# Patient Record
Sex: FEMALE | Race: WHITE | NOT HISPANIC OR LATINO | Employment: PART TIME | URBAN - METROPOLITAN AREA
[De-identification: names, ages, dates, MRNs, and addresses within clinical notes are randomized per-mention and may not be internally consistent; named-entity substitution may affect disease eponyms.]

---

## 2017-01-23 ENCOUNTER — ALLSCRIPTS OFFICE VISIT (OUTPATIENT)
Dept: OTHER | Facility: OTHER | Age: 42
End: 2017-01-23

## 2017-01-23 LAB
FLUAV AG SPEC QL IA: POSITIVE
INFLUENZA B AG (HISTORICAL): NEGATIVE

## 2017-06-13 ENCOUNTER — ALLSCRIPTS OFFICE VISIT (OUTPATIENT)
Dept: OTHER | Facility: OTHER | Age: 42
End: 2017-06-13

## 2017-06-13 DIAGNOSIS — N93.8 OTHER SPECIFIED ABNORMAL UTERINE AND VAGINAL BLEEDING: ICD-10-CM

## 2017-06-13 LAB
BILIRUB UR QL STRIP: NEGATIVE
CLARITY UR: NORMAL
COLOR UR: YELLOW
GLUCOSE (HISTORICAL): NORMAL
HGB UR QL STRIP.AUTO: NEGATIVE
KETONES UR STRIP-MCNC: NORMAL MG/DL
LEUKOCYTE ESTERASE UR QL STRIP: NEGATIVE
NITRITE UR QL STRIP: NEGATIVE
PH UR STRIP.AUTO: 6 [PH]
PROT UR STRIP-MCNC: NEGATIVE MG/DL
SP GR UR STRIP.AUTO: 1.02
UROBILINOGEN UR QL STRIP.AUTO: NORMAL

## 2018-01-13 VITALS
TEMPERATURE: 99.6 F | HEART RATE: 60 BPM | SYSTOLIC BLOOD PRESSURE: 120 MMHG | WEIGHT: 145 LBS | BODY MASS INDEX: 22.76 KG/M2 | RESPIRATION RATE: 16 BRPM | OXYGEN SATURATION: 99 % | HEIGHT: 67 IN | DIASTOLIC BLOOD PRESSURE: 68 MMHG

## 2018-01-13 VITALS
BODY MASS INDEX: 22.13 KG/M2 | TEMPERATURE: 100.8 F | OXYGEN SATURATION: 99 % | DIASTOLIC BLOOD PRESSURE: 80 MMHG | RESPIRATION RATE: 16 BRPM | SYSTOLIC BLOOD PRESSURE: 138 MMHG | HEART RATE: 86 BPM | WEIGHT: 141 LBS | HEIGHT: 67 IN

## 2018-01-15 NOTE — PROGRESS NOTES
Assessment    1  Encounter for preventive health examination (V70 0) (Z00 00)   2  Pharyngoesophageal dysphagia (787 24) (R13 14)   3  Pain of left eye (379 91) (H57 12)    Plan   Health Maintenance    · Always use a seat belt and shoulder strap when riding or driving a motor vehicle ;  Status:Complete;   Done: 89VIA7043   · Begin a limited exercise program ; Status:Complete;   Done: 78CIN4114   · Drink plenty of fluids ; Status:Complete;   Done: 94DNX9304   · Eat a low fat and low cholesterol diet ; Status:Complete;   Done: 56QFY8900   · Use a sun block product with an SPF of 15 or more ; Status:Complete;   Done:  80WYX9370   · We encourage all of our patients to exercise regularly  30 minutes of exercise or physical  activity five or more days a week is recommended for children and adults ;  Status:Complete;   Done: 60AIN6123   · We recommend routine visits to a dentist ; Status:Complete;   Done: 61RJP0998   · (1) CBC/PLT/DIFF; Status:Resulted - Requires Verification;   Done: 91KPW2613 12:00AM   · (1) COMPREHENSIVE METABOLIC PANEL; Status:Resulted - Requires Verification;    Done: 10CSN5488 12:00AM   · (1) LIPID PANEL, FASTING; Status:Resulted - Requires Verification;   Done: 18GCP7020  12:00AM   · (1) TSH; Status:Resulted - Requires Verification;   Done: 26XDK2044 12:00AM   · (1) VITAMIN D 25-HYDROXY; Status:Resulted - Requires Verification;   Done:  97KLM4289 12:00AM   · Urine Dip Automated- POC; Status:Complete;   Done: 49ZXT9448 04:15PM   · Follow-up visit in 1 year Evaluation and Treatment  Follow-up  Status: Complete  Done:  05VBL5918    EKG/ECG- POC; Status:Active; Requested for:58Sbw3434;   Perform: In Office; IMT:63MYS7678;FIZFQPU;    For:Health Maintenance; Ordered By:Yuriy Hammonds;      Discussion/Summary  health maintenance visit Currently, she eats a healthy diet  cervical cancer screening is current GYN DR PARIKH Breast cancer screening: mammogram is current   Colorectal cancer screening: colorectal cancer screening is current  Osteoporosis screening: bone mineral density testing is not indicated  Screening lab work includes hemoglobin, glucose, lipid profile, thyroid function testing, 25-hydroxyvitamin D and urinalysis  The immunizations are up to date  Advice and education were given regarding sunscreen use and seat belt use  DISCUSSED HEALTH MAINTENANCE ISSUES  BW WILL BE OBTAINED  RV 1YR  Chief Complaint  Patient is here today for a complete physical exam; no PAP  lb/lpn      History of Present Illness  HM, Adult Female: The patient is being seen for a health maintenance evaluation  General Health: The patient's health since the last visit is described as good  She has regular dental visits  She denies vision problems  She denies hearing loss  Immunizations status: up to date  Lifestyle:  She consumes a diverse and healthy diet  She does not have any weight concerns  She exercises regularly  She does not use tobacco  She denies alcohol use  She denies drug use  Screening: cancer screening reviewed and current  metabolic screening reviewed and current  risk screening reviewed and current  HPI: [address] [place] [address] Rd RECORD  SWALLOWING ISSUES      Review of Systems    Constitutional: no fever, no chills and not feeling tired  Eyes: no eyesight problems  ENT: no sore throat, no hearing loss and no nasal discharge  Cardiovascular: no chest pain, the heart rate was not fast, no palpitations and no lower extremity edema  Respiratory: no shortness of breath, no cough, no wheezing and no shortness of breath during exertion  Gastrointestinal: no abdominal pain, no nausea, no vomiting and no diarrhea  Genitourinary: no incontinence  Integumentary: no rashes  Neurological: no headache, no numbness, no tingling and no dizziness  Psychiatric: no anxiety and no depression  Endocrine: no muscle weakness     Hematologic/Lymphatic: no swollen glands in the neck  ROS reviewed  Past Medical History    · History of Acute conjunctivitis of both eyes, unspecified acute conjunctivitis (372 00)  (H10 33)   · History of Anal fissure (565 0) (K60 2)   · History of streptococcal pharyngitis (V12 09) (Z87 09)   · No pertinent past medical history   · History of URTI (acute upper respiratory infection) (465 9) (J06 9)    Surgical History    · Denied: History Of Prior Surgery    Family History    · Family history of hypertension (V17 49) (Z82 49)    · Family history of malignant neoplasm of breast (V16 3) (Z80 3)    Social History    · Consumes alcohol occasionally (V49 89) (Z78 9)   · Drinks coffee   · Never a smoker   · Occasional caffeine consumption    Current Meds   1  No Reported Medications  Requested for: 64STV6864 Recorded    Allergies    1  No Known Drug Allergies    Vitals   Recorded: 68EZR5475 03:51PM   Temperature 98 8 F, Tympanic    Heart Rate 68, R Radial    Pulse Quality Normal, R Radial    Respiration 16    Respiration Quality Normal    Systolic 96, RUE, Sitting    Diastolic 68, RUE, Sitting    Height 5 ft 6 75 in    Weight 141 lb     BMI Calculated 22 25    BSA Calculated 1 74    Patient Refused Height No No   Patient Refused Weight No No     Physical Exam    Constitutional   General appearance: No acute distress, well appearing and well nourished  Head and Face   Head and face: Normal     Palpation of the face and sinuses: No sinus tenderness  Eyes   Conjunctiva and lids: No swelling, erythema or discharge  Pupils and irises: Equal, round, reactive to light  Ophthalmoscopic examination: Normal fundi and optic discs  Ears, Nose, Mouth, and Throat   External inspection of ears and nose: Normal     Otoscopic examination: Tympanic membranes translucent with normal light reflex  Canals patent without erythema  Nasal mucosa, septum, and turbinates: Normal without edema or erythema  Lips, teeth, and gums: Normal, good dentition  Oropharynx: Normal with no erythema, edema, exudate or lesions  Neck   Neck: Supple, symmetric, trachea midline, no masses  Thyroid: Normal, no thyromegaly  Pulmonary   Respiratory effort: No increased work of breathing or signs of respiratory distress  Auscultation of lungs: Clear to auscultation  Cardiovascular   Auscultation of heart: Normal rate and rhythm, normal S1 and S2, no murmurs  Carotid pulses: 2+ bilaterally  Abdominal aorta: Normal     Femoral pulses: 2+ bilaterally  Pedal pulses: 2+ bilaterally  Peripheral vascular exam: Normal     Examination of extremities for edema and/or varicosities: Normal     Abdomen   Abdomen: Non-tender, no masses  Liver and spleen: No hepatomegaly or splenomegaly  Examination for hernias: No hernia appreciated  Lymphatic   Palpation of lymph nodes in neck: No lymphadenopathy  Palpation of lymph nodes in axillae: No lymphadenopathy  Musculoskeletal   Gait and station: Normal     Digits and nails: Normal without clubbing or cyanosis  Joints, bones, and muscles: Normal     Range of motion: Normal     Stability: Normal     Muscle strength/tone: Normal     Skin   Skin and subcutaneous tissue: Normal without rashes or lesions  Palpation of skin and subcutaneous tissue: Normal turgor  Neurologic   Cranial nerves: Cranial nerves II-XII intact  Cortical function: Normal mental status  Reflexes: 2+ and symmetric  Sensation: No sensory loss  Coordination: Normal finger to nose and heel to shin      Psychiatric   Judgment and insight: Normal     Orientation to person, place, and time: Normal     Mood and affect: Normal        Results/Data  PHQ-2 Adult Depression Screening 11KUX9655 03:54PM User, s     Test Name Result Flag Reference   PHQ-2 Adult Depression Score 0     Q1: 0, Q2: 0   PHQ-2 Adult Depression Screening Negative       Falls Risk Assessment (Dx V80 09 Screen for Neurologic Disorder) 55INT2271 03:52PM User, s Test Name Result Flag Reference   Falls Risk      No falls in the past year     (1) CBC/PLT/DIFF 68TKW6553 12:00AM Dearl Elizabeth     Test Name Result Flag Reference   WBC 7 2 x10E3/uL  3 4-10 8   RBC 3 97 x10E6/uL  3 77-5 28   Hemoglobin 11 4 g/dL  11 1-15 9   Hematocrit 35 4 %  34 0-46  6   MCV 89 fL  79-97   MCH 28 7 pg  26 6-33 0   MCHC 32 2 g/dL  31 5-35 7   RDW 13 5 %  12 3-15 4   Platelets 358 R32X3/KOCH  150-379   Neutrophils 53 %     Lymphs 34 %     Monocytes 8 %     Eos 4 %     Basos 1 %     Neutrophils (Absolute) 3 8 x10E3/uL  1 4-7 0   Lymphs (Absolute) 2 4 x10E3/uL  0 7-3 1   Monocytes(Absolute) 0 6 x10E3/uL  0 1-0 9   Eos (Absolute) 0 3 x10E3/uL  0 0-0 4   Baso (Absolute) 0 1 x10E3/uL  0 0-0 2   Immature Granulocytes 0 %     Immature Grans (Abs) 0 0 x10E3/uL  0 0-0 1       Signatures   Electronically signed by : Javier Garcia MD; Feb 11 2016  7:21PM EST                       (Author)

## 2018-01-16 NOTE — RESULT NOTES
Message   PLEASE CALL   BW WAS ALL GOOD   CHOL    THANKS     Verified Results  (1) CBC/PLT/DIFF 50ZMM9498 12:00AM Dewey Rising     Test Name Result Flag Reference   WBC 7 2 x10E3/uL  3 4-10 8   RBC 3 97 x10E6/uL  3 77-5 28   Hemoglobin 11 4 g/dL  11 1-15 9   Hematocrit 35 4 %  34 0-46  6   MCV 89 fL  79-97   MCH 28 7 pg  26 6-33 0   MCHC 32 2 g/dL  31 5-35 7   RDW 13 5 %  12 3-15 4   Platelets 687 T34O1/FF  150-379   Neutrophils 53 %     Lymphs 34 %     Monocytes 8 %     Eos 4 %     Basos 1 %     Neutrophils (Absolute) 3 8 x10E3/uL  1 4-7 0   Lymphs (Absolute) 2 4 x10E3/uL  0 7-3 1   Monocytes(Absolute) 0 6 x10E3/uL  0 1-0 9   Eos (Absolute) 0 3 x10E3/uL  0 0-0 4   Baso (Absolute) 0 1 x10E3/uL  0 0-0 2   Immature Granulocytes 0 %     Immature Grans (Abs) 0 0 x10E3/uL  0 0-0 1     (1) COMPREHENSIVE METABOLIC PANEL 56CIK7786 31:96JB Dewey Rising     Test Name Result Flag Reference   Glucose, Serum 94 mg/dL  65-99   BUN 12 mg/dL  6-24   Creatinine, Serum 0 80 mg/dL  0 57-1 00   eGFR If NonAfricn Am 93 mL/min/1 73  >59   eGFR If Africn Am 107 mL/min/1 73  >59   BUN/Creatinine Ratio 15  9-23   Sodium, Serum 137 mmol/L  134-144   Potassium, Serum 4 2 mmol/L  3 5-5 2   Chloride, Serum 101 mmol/L     Carbon Dioxide, Total 23 mmol/L  18-29   Calcium, Serum 8 9 mg/dL  8 7-10 2   Protein, Total, Serum 6 8 g/dL  6 0-8 5   Albumin, Serum 4 2 g/dL  3 5-5 5   Globulin, Total 2 6 g/dL  1 5-4 5   A/G Ratio 1 6  1 1-2 5   Bilirubin, Total 0 3 mg/dL  0 0-1 2   Alkaline Phosphatase, S 34 IU/L L    AST (SGOT) 11 IU/L  0-40   ALT (SGPT) 9 IU/L  0-32     (1) LIPID PANEL, FASTING 05CRS6851 12:00AM Dewey Rising     Test Name Result Flag Reference   Cholesterol, Total 167 mg/dL  100-199   Triglycerides 62 mg/dL  0-149   HDL Cholesterol 61 mg/dL  >39   According to ATP-III Guidelines, HDL-C >59 mg/dL is considered a  negative risk factor for CHD     VLDL Cholesterol Ulises 12 mg/dL  5-40   LDL Cholesterol Calc 94 mg/dL 0-99   T  Chol/HDL Ratio 2 7 ratio units  0 0-4 4   T  Chol/HDL Ratio                                                             Men  Women                                               1/2 Avg  Risk  3 4    3 3                                                   Avg Risk  5 0    4 4                                                2X Avg  Risk  9 6    7 1                                                3X Avg  Risk 23 4   11 0     (1) TSH 59BPI0926 12:00AM Reubin      Test Name Result Flag Reference   TSH 0 786 uIU/mL  0 450-4 500     (1) VITAMIN D 25-HYDROXY 72BRK1843 12:00AM Reubin      Test Name Result Flag Reference   Vitamin D, 25-Hydroxy 34 1 ng/mL  30 0-100 0   Vitamin D deficiency has been defined by the [address] practice guideline as a  level of serum 25-OH vitamin D less than 20 ng/mL (1,2)  The Endocrine Society went on to further define vitamin D  insufficiency as a level between 21 and 29 ng/mL (2)  1  IOM ([employer])  2010  Dietary reference     intakes for calcium and D  [facility]: The     [employer]  2  Gian MF, Michael RUBI, Camille PERALTA, et al      Evaluation, treatment, and prevention of vitamin D     deficiency: an Endocrine Society clinical practice     guideline  JCEM  2011 Jul; 96(7):1911-30

## 2018-01-18 NOTE — PROGRESS NOTES
Assessment    1  Encounter for preventive health examination (V70 0) (Z00 00)   2  Dysfunctional uterine bleeding (626 8) (N93 8)   3  GERD without esophagitis (530 81) (K21 9)   4  Esophageal stricture (530 3) (K22 2)    Plan  Dysfunctional uterine bleeding    · US PELVIS COMPLETE NON OB; Status:Hold For - Scheduling; Requested  for:13Jun2017;   Dysfunctional uterine bleeding, Health Maintenance, Esophageal stricture, GERD without  esophagitis    · (1) CBC/PLT/DIFF; Status: In Progress - Specimen/Data Collected;   Done: 48GRC9099   · (1) COMPREHENSIVE METABOLIC PANEL; Status: In Progress - Specimen/Data  Collected;   Done: 94CFM7518   · (1) C-REACTIVE PROTEIN; Status: In Progress - Specimen/Data Collected;   Done:  39IYZ9155   · (1) LIPID PANEL, FASTING; Status: In Progress - Specimen/Data Collected;   Done:  55PCF1856   · (1) SED RATE; Status: In Progress - Specimen/Data Collected;   Done: 11DYE0298   · (1) TSH; Status: In Progress - Specimen/Data Collected;   Done: 94EJS1993   · EKG/ECG- POC; Status:Complete;   Done: 77EIH0458  Esophageal stricture, GERD without esophagitis    · Mari Hargrove MD, Liza Reinoso  (Gastroenterology) Co-Management  *  Status: Hold For -  Scheduling  Requested for: 27UVG3841  are Referring to a non- Preferred Provider : Established Patient  Care Summary provided  : Yes  Health Maintenance    · Urine Dip Automated- POC; Status:Resulted - Requires Verification,Retrospective  Authorization;   Done: 77VZY5935 04:28PM   · Follow-up visit in 1 year Evaluation and Treatment  Follow-up  Status: Hold For -  Scheduling  Requested for: 21VAI7372   · Always use a seat belt and shoulder strap when riding or driving a motor vehicle ;  Status:Complete;   Done: 88XAS7366   · Begin a limited exercise program ; Status:Complete;   Done: 45WAM8378   · Drink plenty of fluids ; Status:Complete;   Done: 96IYX9524   · Eat a low fat and low cholesterol diet ; Status:Complete;   Done: 32FRW7563   · Eat a normal well-balanced diet ; Status:Complete;   Done: 51AVS1114   · Use a sun block product with an SPF of 15 or more ; Status:Complete;   Done:  81WVK2124   · We encourage all of our patients to exercise regularly  30 minutes of exercise or physical  activity five or more days a week is recommended for children and adults ;  Status:Complete;   Done: 12YWA2914   · We recommend routine visits to a dentist ; Status:Complete;   Done: 98RFI4639    Discussion/Summary  health maintenance visit Currently, she eats a healthy diet  cervical cancer screening is current GYN DR PARIKH Breast cancer screening: mammogram is current  Colorectal cancer screening: colorectal cancer screening is current  Osteoporosis screening: bone mineral density testing is not indicated  Screening lab work includes hemoglobin, glucose, lipid profile, thyroid function testing, 25-hydroxyvitamin D and urinalysis  The immunizations are up to date  Advice and education were given regarding sunscreen use and seat belt use  DISCUSSED HEALTH MAINTENANCE ISSUES  BW WILL BE OBTAINED  US PELVIS  GI CONSULT  RECOMMEND CALCIUM SUPPLEMENTATION 7074-0473 MG DAILY   VITAMIN D3 1000 IU DAILY  REVISIT IN 1 YR FOR ANNUAL EXAM    The treatment plan was reviewed with the patient/guardian  The patient/guardian understands and agrees with the treatment plan      Chief Complaint  Patient is here today for her annual physical exam, L  Dimas/FE      History of Present Illness  HM, Adult Female: The patient is being seen for a health maintenance evaluation  General Health: The patient's health since the last visit is described as good  She has regular dental visits  She denies vision problems  She denies hearing loss  Immunizations status: not up to date  Lifestyle:  She consumes a diverse and healthy diet  She does not have any weight concerns  She exercises regularly  She does not use tobacco  She denies alcohol use  Screening: cancer screening reviewed and current     metabolic screening reviewed and current  risk screening reviewed and current  HPI: [address] [place] [address] Rd RECORD  SWALLOWING ISSUES      Review of Systems    Constitutional: no fever, no chills and not feeling tired  Eyes: no eyesight problems  ENT: no sore throat, no hearing loss and no nasal discharge  Cardiovascular: no chest pain, the heart rate was not fast, no palpitations and no lower extremity edema  Respiratory: no shortness of breath, no cough, no wheezing and no shortness of breath during exertion  Gastrointestinal: no abdominal pain, no nausea, no vomiting and no diarrhea  Genitourinary: unexplained vaginal bleeding, but no incontinence  Integumentary: no rashes  Neurological: no headache, no numbness, no tingling and no dizziness  Psychiatric: no anxiety and no depression  Endocrine: no muscle weakness  Hematologic/Lymphatic: no swollen glands in the neck  ROS reviewed  Past Medical History    · History of Anal fissure (565 0) (K60 2)    Surgical History    · Denied: History Of Prior Surgery    Family History  Mother    · Family history of hypertension (V17 49) (Z82 49)  Paternal Grandfather    · Family history of Alzheimer's dementia with behavioral disturbance, unspecified timing  of dementia onset  Maternal Aunt    · Family history of Schizoaffective schizophrenia  Cousin    · Family history of Schizoaffective schizophrenia  Family History    · Family history of malignant neoplasm of breast (V16 3) (Z80 3)    Social History    · Consumes alcohol occasionally (V49 89) (Z78 9)   · Dental care, regularly   · Does not use illicit drugs (J17 22) (Z78 9)   · Drinks coffee   · Never a smoker   · Occasional caffeine consumption    Current Meds   1  No Reported Medications Recorded    Allergies    1   No Known Drug Allergies    Vitals   Recorded: 94LII4476 04:19PM   Temperature 99 6 F, Temporal   Heart Rate 60, R Radial   Pulse Quality Normal, R Radial   Respiration Quality Normal   Respiration 16   Systolic 407, LUE   Diastolic 68, LUE   Height 5 ft 7 in   Weight 145 lb    BMI Calculated 22 71   BSA Calculated 1 76   O2 Saturation 99     Physical Exam    Constitutional   General appearance: No acute distress, well appearing and well nourished  Head and Face   Head and face: Normal     Eyes   Conjunctiva and lids: No swelling, erythema or discharge  Pupils and irises: Equal, round, reactive to light  Ophthalmoscopic examination: Normal fundi and optic discs  Ears, Nose, Mouth, and Throat   External inspection of ears and nose: Normal     Otoscopic examination: Tympanic membranes translucent with normal light reflex  Canals patent without erythema  Nasal mucosa, septum, and turbinates: Normal without edema or erythema  Lips, teeth, and gums: Normal, good dentition  Oropharynx: Normal with no erythema, edema, exudate or lesions  Neck   Neck: Supple, symmetric, trachea midline, no masses  Thyroid: Normal, no thyromegaly  Pulmonary   Respiratory effort: No increased work of breathing or signs of respiratory distress  Auscultation of lungs: Clear to auscultation  Cardiovascular   Auscultation of heart: Normal rate and rhythm, normal S1 and S2, no murmurs  Carotid pulses: 2+ bilaterally  Abdominal aorta: Normal     Femoral pulses: 2+ bilaterally  Pedal pulses: 2+ bilaterally  Peripheral vascular exam: Normal     Examination of extremities for edema and/or varicosities: Normal     Chest   Palpation of breasts and axillae: Normal, no masses palpated  Abdomen   Abdomen: Non-tender, no masses  Liver and spleen: No hepatomegaly or splenomegaly  Examination for hernias: No hernia appreciated  Lymphatic   Palpation of lymph nodes in neck: No lymphadenopathy  Palpation of lymph nodes in axillae: No lymphadenopathy  Musculoskeletal   Gait and station: Normal     Digits and nails: Normal without clubbing or cyanosis      Joints, bones, and muscles: Normal     Range of motion: Normal     Stability: Normal     Muscle strength/tone: Normal     Skin   Skin and subcutaneous tissue: Normal without rashes or lesions  Palpation of skin and subcutaneous tissue: Normal turgor  Neurologic   Cranial nerves: Cranial nerves II-XII intact  Cortical function: Normal mental status  Reflexes: 2+ and symmetric  Sensation: No sensory loss  Coordination: Normal finger to nose and heel to shin      Psychiatric   Judgment and insight: Normal     Orientation to person, place, and time: Normal     Mood and affect: Normal        Results/Data  Urine Dip Automated- POC [id number] 04:28PM Joanne Rivera     Test Name Result Flag Reference   Color Yellow     Clarity Transparent     Leukocytes negative     Nitrite negative     Blood negative     Bilirubin negative     Urobilinogen normal     Protein negative     Ph 6     Specific Gravity 1 020     Ketone negaive     Glucose normal     Color Yellow     Clarity Transparent     Leukocytes negative     Nitrite negative     Blood negative     Bilirubin negative     Urobilinogen normal     Protein negative     Ph 6     Specific Gravity 1 020     Ketone negaive     Glucose normal               Signatures   Electronically signed by : Holley Saleh MD; Jun 13 2017  4:52PM EST                       (Author)

## 2018-02-15 ENCOUNTER — OFFICE VISIT (OUTPATIENT)
Dept: FAMILY MEDICINE CLINIC | Facility: CLINIC | Age: 43
End: 2018-02-15
Payer: COMMERCIAL

## 2018-02-15 VITALS
WEIGHT: 148.2 LBS | DIASTOLIC BLOOD PRESSURE: 80 MMHG | HEIGHT: 67 IN | RESPIRATION RATE: 18 BRPM | BODY MASS INDEX: 23.26 KG/M2 | TEMPERATURE: 98.7 F | SYSTOLIC BLOOD PRESSURE: 110 MMHG

## 2018-02-15 DIAGNOSIS — R31.9 URINARY TRACT INFECTION WITH HEMATURIA, SITE UNSPECIFIED: Primary | ICD-10-CM

## 2018-02-15 DIAGNOSIS — N39.0 URINARY TRACT INFECTION WITH HEMATURIA, SITE UNSPECIFIED: Primary | ICD-10-CM

## 2018-02-15 DIAGNOSIS — R30.0 DYSURIA: ICD-10-CM

## 2018-02-15 DIAGNOSIS — J06.9 UPPER RESPIRATORY TRACT INFECTION, UNSPECIFIED TYPE: ICD-10-CM

## 2018-02-15 DIAGNOSIS — R05.9 COUGH: ICD-10-CM

## 2018-02-15 PROBLEM — K22.2 ESOPHAGEAL STRICTURE: Status: ACTIVE | Noted: 2017-06-13

## 2018-02-15 PROBLEM — N93.8 DYSFUNCTIONAL UTERINE BLEEDING: Status: ACTIVE | Noted: 2017-06-13

## 2018-02-15 PROBLEM — K21.9 GERD WITHOUT ESOPHAGITIS: Status: ACTIVE | Noted: 2017-06-13

## 2018-02-15 LAB
SL AMB  POCT GLUCOSE, UA: NORMAL
SL AMB LEUKOCYTE ESTERASE,UA: 500
SL AMB POCT BILIRUBIN,UA: NEGATIVE
SL AMB POCT BLOOD,UA: 250
SL AMB POCT CLARITY,UA: CLEAR
SL AMB POCT COLOR,UA: YELLOW
SL AMB POCT KETONES,UA: NEGATIVE
SL AMB POCT NITRITE,UA: NEGATIVE
SL AMB POCT PH,UA: 7
SL AMB POCT SPECIFIC GRAVITY,UA: 1
SL AMB POCT URINE PROTEIN: NEGATIVE
SL AMB POCT UROBILINOGEN: NORMAL

## 2018-02-15 PROCEDURE — 99213 OFFICE O/P EST LOW 20 MIN: CPT | Performed by: NURSE PRACTITIONER

## 2018-02-15 PROCEDURE — 81003 URINALYSIS AUTO W/O SCOPE: CPT | Performed by: NURSE PRACTITIONER

## 2018-02-15 RX ORDER — CIPROFLOXACIN 500 MG/1
500 TABLET, FILM COATED ORAL EVERY 12 HOURS SCHEDULED
Qty: 14 TABLET | Refills: 0 | Status: SHIPPED | OUTPATIENT
Start: 2018-02-15 | End: 2018-02-22

## 2018-02-15 RX ORDER — PHENAZOPYRIDINE HYDROCHLORIDE 200 MG/1
200 TABLET, FILM COATED ORAL
Qty: 10 TABLET | Refills: 0 | Status: SHIPPED | OUTPATIENT
Start: 2018-02-15 | End: 2018-06-20 | Stop reason: ALTCHOICE

## 2018-02-15 RX ORDER — TOBRAMYCIN AND DEXAMETHASONE 3; 1 MG/ML; MG/ML
1-2 SUSPENSION/ DROPS OPHTHALMIC 4 TIMES DAILY
COMMUNITY
Start: 2018-01-05 | End: 2018-06-20 | Stop reason: ALTCHOICE

## 2018-02-15 RX ORDER — BENZONATATE 200 MG/1
200 CAPSULE ORAL 3 TIMES DAILY PRN
Qty: 20 CAPSULE | Refills: 0 | Status: SHIPPED | OUTPATIENT
Start: 2018-02-15 | End: 2018-06-20 | Stop reason: ALTCHOICE

## 2018-02-15 RX ORDER — PHENAZOPYRIDINE HYDROCHLORIDE 200 MG/1
200 TABLET, FILM COATED ORAL
Status: DISCONTINUED | OUTPATIENT
Start: 2018-02-15 | End: 2018-02-15

## 2018-02-15 NOTE — PROGRESS NOTES
Assessment/Plan:    Cough  - non-productive lasting > 1 month    Upper respiratory tract infection  - mild, patient mostly recovered but still has residual cough, bothersome and non-productive     Diagnoses and all orders for this visit:    Urinary tract infection with hematuria, site unspecified  -     ciprofloxacin (CIPRO) 500 mg tablet; Take 1 tablet (500 mg total) by mouth every 12 (twelve) hours for 7 days    Dysuria  -     POCT urine dip  -     Urine culture  -     phenazopyridine (PYRIDIUM) 200 mg tablet; Take 1 tablet (200 mg total) by mouth 3 (three) times a day with meals    Cough  -     benzonatate (TESSALON) 200 MG capsule; Take 1 capsule (200 mg total) by mouth 3 (three) times a day as needed for cough    Upper respiratory tract infection, unspecified type    Other orders  -     tobramycin-dexamethasone (TOBRADEX) ophthalmic suspension; Apply 1-2 drops to eye 4 (four) times a day    Subjective:      Patient ID: Darnell Padron is a 43 y o  female  Urinary Tract Infection    This is a new problem  The current episode started today  The problem has been unchanged  The quality of the pain is described as burning  The pain is at a severity of 3/10  The pain is moderate  There has been no fever  She is sexually active  Associated symptoms include frequency  Pertinent negatives include no chills, flank pain, hematuria, nausea or vomiting  She has tried nothing for the symptoms  Cough   This is a recurrent problem  The current episode started more than 1 month ago  The problem has been unchanged  The problem occurs every few minutes  The cough is non-productive  Pertinent negatives include no chest pain, chills, ear congestion, ear pain, fever, myalgias, nasal congestion, postnasal drip, rash, sore throat or shortness of breath  She has tried OTC cough suppressant for the symptoms  The treatment provided no relief         The following portions of the patient's history were reviewed and updated as appropriate: allergies, current medications, past family history, past medical history, past social history, past surgical history and problem list     Review of Systems   Constitutional: Negative for chills, fatigue and fever  HENT: Negative for ear pain, postnasal drip and sore throat  Respiratory: Positive for cough  Negative for shortness of breath  Cardiovascular: Negative for chest pain  Gastrointestinal: Negative for abdominal pain, diarrhea, nausea and vomiting  Genitourinary: Positive for dysuria and frequency  Negative for flank pain, hematuria and vaginal discharge  Musculoskeletal: Negative for myalgias  Skin: Negative for rash  Objective:    Vitals:    02/15/18 1616   BP: 110/80   Resp: 18   Temp: 98 7 °F (37 1 °C)        Physical Exam   Constitutional: She is oriented to person, place, and time  She appears well-developed and well-nourished  HENT:   Head: Normocephalic and atraumatic  Right Ear: External ear normal  No tenderness  No middle ear effusion  Left Ear: External ear normal  No tenderness  No middle ear effusion  Eyes: Conjunctivae are normal  Pupils are equal, round, and reactive to light  Neck: Normal range of motion  Neck supple  No thyromegaly present  Cardiovascular: Normal rate, regular rhythm and normal heart sounds  Pulmonary/Chest: Effort normal and breath sounds normal    Abdominal: Soft  Bowel sounds are normal  She exhibits no distension  There is no tenderness  Lymphadenopathy:     She has no cervical adenopathy  Neurological: She is alert and oriented to person, place, and time  Skin: Skin is warm and dry  No rash noted

## 2018-06-20 ENCOUNTER — OFFICE VISIT (OUTPATIENT)
Dept: FAMILY MEDICINE CLINIC | Facility: CLINIC | Age: 43
End: 2018-06-20
Payer: COMMERCIAL

## 2018-06-20 VITALS
HEART RATE: 69 BPM | BODY MASS INDEX: 22.76 KG/M2 | HEIGHT: 67 IN | OXYGEN SATURATION: 98 % | RESPIRATION RATE: 14 BRPM | SYSTOLIC BLOOD PRESSURE: 90 MMHG | WEIGHT: 145 LBS | DIASTOLIC BLOOD PRESSURE: 56 MMHG | TEMPERATURE: 98.7 F

## 2018-06-20 DIAGNOSIS — K22.2 ESOPHAGEAL STRICTURE: ICD-10-CM

## 2018-06-20 DIAGNOSIS — Z00.00 ROUTINE GENERAL MEDICAL EXAMINATION AT A HEALTH CARE FACILITY: Primary | ICD-10-CM

## 2018-06-20 DIAGNOSIS — K21.9 GERD WITHOUT ESOPHAGITIS: ICD-10-CM

## 2018-06-20 DIAGNOSIS — Z13.6 SCREENING FOR HYPERTENSION: ICD-10-CM

## 2018-06-20 DIAGNOSIS — Z12.31 ENCOUNTER FOR SCREENING MAMMOGRAM FOR BREAST CANCER: ICD-10-CM

## 2018-06-20 DIAGNOSIS — Z13.220 SCREENING FOR HYPERLIPIDEMIA: ICD-10-CM

## 2018-06-20 DIAGNOSIS — Z13.29 SCREENING FOR HYPOTHYROIDISM: ICD-10-CM

## 2018-06-20 PROBLEM — R05.9 COUGH: Status: RESOLVED | Noted: 2018-02-15 | Resolved: 2018-06-20

## 2018-06-20 PROBLEM — J06.9 UPPER RESPIRATORY TRACT INFECTION: Status: RESOLVED | Noted: 2018-02-15 | Resolved: 2018-06-20

## 2018-06-20 LAB — ECG INTERP DURING EX: NORMAL MS

## 2018-06-20 PROCEDURE — 99396 PREV VISIT EST AGE 40-64: CPT | Performed by: FAMILY MEDICINE

## 2018-06-20 PROCEDURE — 93000 ELECTROCARDIOGRAM COMPLETE: CPT | Performed by: FAMILY MEDICINE

## 2018-06-20 RX ORDER — PANTOPRAZOLE SODIUM 40 MG/1
TABLET, DELAYED RELEASE ORAL
COMMUNITY
Start: 2018-06-05 | End: 2019-10-03 | Stop reason: ALTCHOICE

## 2018-06-20 NOTE — PATIENT INSTRUCTIONS
DISCUSSED HEALTH ISSUES  HEALTHY DIET AND EXERCISE  BW WILL BE OBTAINED  MAMMOGRAPHY   RECOMMEND CALCIUM 4479-1428 MG DAILY  VITAMIN D3  1000 IU DAILY  RV IN 1 YEAR FOR ANNUAL EXAM, SOONER IF NEEDED

## 2018-06-20 NOTE — PROGRESS NOTES
[facility] HEALTH MAINTENANCE OFFICE VISIT  [facility]    NAME: Berta Pitt  AGE: 43 y o  SEX: female  : 1975     DATE: 2018    Assessment and Plan     Problem List Items Addressed This Visit     Esophageal stricture    Relevant Medications    pantoprazole (PROTONIX) 40 mg tablet    Other Relevant Orders    CBC and differential    GERD without esophagitis    Relevant Medications    pantoprazole (PROTONIX) 40 mg tablet    Other Relevant Orders    CBC and differential    Encounter for screening mammogram for breast cancer    Relevant Orders    Mammo screening bilateral w cad    Screening for hypothyroidism    Relevant Orders    Lipid panel    TSH, 3rd generation with Free T4 reflex    Screening for hyperlipidemia    Relevant Orders    Lipid panel    TSH, 3rd generation with Free T4 reflex    Screening for hypertension    Relevant Orders    Comprehensive metabolic panel    Lipid panel    POCT ECG    POCT urine dip auto non-scope    Routine general medical examination at a health care facility - Primary    Relevant Orders    CBC and differential    Comprehensive metabolic panel    Lipid panel    TSH, 3rd generation with Free T4 reflex    POCT ECG    POCT urine dip auto non-scope               Return in about 1 year (around 2019) for Annual physical         Chief Complaint   No chief complaint on file  History of Present Illness     DISCUSSED HEALTH ISSUES  REVIEWED MEDICAL RECORD  NO CONCERNS AT THIS TIME          Well Adult Physical   Patient here for a comprehensive physical exam       Diet and Physical Activity  Diet: well balanced diet  Weight concerns: Patient is overweight (BMI 25 0-29  9)  Exercise: infrequently      Depression Screen  PHQ-9 Depression Screening    PHQ-9:    Frequency of the following problems over the past two weeks:               General Health  Hearing: Normal:  bilateral  Vision: no vision problems  Dental: regular dental visits    Reproductive Health          The following portions of the patient's history were reviewed and updated as appropriate: allergies, current medications, past family history, past medical history, past social history, past surgical history and problem list     Review of Systems     Review of Systems   Constitutional: Negative for chills, fatigue and fever  HENT: Negative for congestion, ear discharge, ear pain, mouth sores, postnasal drip, sore throat and trouble swallowing  Eyes: Negative for pain, discharge and visual disturbance  Respiratory: Negative for cough, shortness of breath and wheezing  Cardiovascular: Negative for chest pain, palpitations and leg swelling  Gastrointestinal: Negative for abdominal distention, abdominal pain, blood in stool, diarrhea and nausea  Endocrine: Negative for polydipsia, polyphagia and polyuria  Genitourinary: Negative for dysuria, frequency, hematuria and urgency  Musculoskeletal: Negative for arthralgias, gait problem and joint swelling  Skin: Negative for pallor and rash  Neurological: Negative for dizziness, syncope, speech difficulty, weakness, light-headedness, numbness and headaches  Hematological: Negative for adenopathy  Psychiatric/Behavioral: Negative for behavioral problems, confusion and sleep disturbance  The patient is not nervous/anxious  Past Medical History     Past Medical History:   Diagnosis Date    GERD (gastroesophageal reflux disease)        Past Surgical History     No past surgical history on file      Social History     Social History     Social History    Marital status: /Civil Union     Spouse name: N/A    Number of children: N/A    Years of education: N/A     Social History Main Topics    Smoking status: Former Smoker    Smokeless tobacco: Never Used    Alcohol use Yes      Comment: socially    Drug use: No    Sexual activity: Not on file     Other Topics Concern    Not on file Social History Narrative    No narrative on file       Family History     Family History   Problem Relation Age of Onset    Hypertension Mother     Stroke Mother     Testicular cancer Father        Current Medications       Current Outpatient Prescriptions:     pantoprazole (PROTONIX) 40 mg tablet, , Disp: , Rfl:      Allergies     Allergies   Allergen Reactions    Bactrim [Sulfamethoxazole-Trimethoprim] Hives    Sulfa Antibiotics        Objective     There were no vitals taken for this visit  Physical Exam   Constitutional: She is oriented to person, place, and time  She appears well-developed and well-nourished  HENT:   Head: Normocephalic and atraumatic  Right Ear: External ear normal    Left Ear: External ear normal    Nose: Nose normal    Mouth/Throat: Oropharynx is clear and moist    Eyes: Conjunctivae and EOM are normal  Pupils are equal, round, and reactive to light  Right eye exhibits no discharge  Left eye exhibits no discharge  Neck: Normal range of motion  Neck supple  No thyromegaly present  Cardiovascular: Normal rate, regular rhythm, normal heart sounds and intact distal pulses  No murmur heard  Pulmonary/Chest: Effort normal and breath sounds normal  She has no wheezes  She has no rales  Abdominal: Soft  Bowel sounds are normal  She exhibits no distension and no mass  There is no tenderness  There is no rebound and no guarding  Genitourinary: No breast swelling, tenderness or discharge  Musculoskeletal: Normal range of motion  She exhibits no edema, tenderness or deformity  Lymphadenopathy:     She has no cervical adenopathy  Neurological: She is alert and oriented to person, place, and time  She has normal reflexes  No cranial nerve deficit  She exhibits normal muscle tone  Coordination normal    Skin: Skin is warm and dry  No rash noted  No erythema  Psychiatric: She has a normal mood and affect   Her behavior is normal  Judgment and thought content normal  Vitals reviewed          No exam data present    Health Maintenance     Health Maintenance   Topic Date Due    HIV SCREENING  1975    Depression Screening PHQ-9  1975    DTaP,Tdap,and Td Vaccines (1 - Tdap) 08/31/1996    INFLUENZA VACCINE  09/01/2018     Immunization History   Administered Date(s) Administered    Influenza Quadrivalent Preservative Free 3 years and older IM 11/01/2016, 10/15/2017       Brenda Shah MD  [facility]

## 2019-03-12 ENCOUNTER — OFFICE VISIT (OUTPATIENT)
Dept: FAMILY MEDICINE CLINIC | Facility: CLINIC | Age: 44
End: 2019-03-12
Payer: COMMERCIAL

## 2019-03-12 VITALS
HEART RATE: 74 BPM | BODY MASS INDEX: 23.39 KG/M2 | TEMPERATURE: 98.6 F | WEIGHT: 149 LBS | RESPIRATION RATE: 16 BRPM | SYSTOLIC BLOOD PRESSURE: 100 MMHG | HEIGHT: 67 IN | OXYGEN SATURATION: 99 % | DIASTOLIC BLOOD PRESSURE: 60 MMHG

## 2019-03-12 DIAGNOSIS — S30.0XXA SACRAL CONTUSION, INITIAL ENCOUNTER: Primary | ICD-10-CM

## 2019-03-12 DIAGNOSIS — M53.3 SACRAL BACK PAIN: ICD-10-CM

## 2019-03-12 DIAGNOSIS — R09.82 POST-NASAL DRIP: ICD-10-CM

## 2019-03-12 PROBLEM — Z12.31 ENCOUNTER FOR SCREENING MAMMOGRAM FOR BREAST CANCER: Status: RESOLVED | Noted: 2018-06-20 | Resolved: 2019-03-12

## 2019-03-12 PROBLEM — Z13.6 SCREENING FOR HYPERTENSION: Status: RESOLVED | Noted: 2018-06-20 | Resolved: 2019-03-12

## 2019-03-12 PROBLEM — Z13.29 SCREENING FOR HYPOTHYROIDISM: Status: RESOLVED | Noted: 2018-06-20 | Resolved: 2019-03-12

## 2019-03-12 PROBLEM — Z13.220 SCREENING FOR HYPERLIPIDEMIA: Status: RESOLVED | Noted: 2018-06-20 | Resolved: 2019-03-12

## 2019-03-12 PROBLEM — Z00.00 ROUTINE GENERAL MEDICAL EXAMINATION AT A HEALTH CARE FACILITY: Status: RESOLVED | Noted: 2018-06-20 | Resolved: 2019-03-12

## 2019-03-12 PROCEDURE — 3008F BODY MASS INDEX DOCD: CPT | Performed by: NURSE PRACTITIONER

## 2019-03-12 PROCEDURE — 99213 OFFICE O/P EST LOW 20 MIN: CPT | Performed by: NURSE PRACTITIONER

## 2019-03-12 PROCEDURE — 1036F TOBACCO NON-USER: CPT | Performed by: NURSE PRACTITIONER

## 2019-03-12 PROCEDURE — 96372 THER/PROPH/DIAG INJ SC/IM: CPT | Performed by: NURSE PRACTITIONER

## 2019-03-12 RX ORDER — KETOROLAC TROMETHAMINE 30 MG/ML
60 INJECTION, SOLUTION INTRAMUSCULAR; INTRAVENOUS ONCE
Status: COMPLETED | OUTPATIENT
Start: 2019-03-12 | End: 2019-03-12

## 2019-03-12 RX ORDER — CYCLOBENZAPRINE HCL 5 MG
5 TABLET ORAL 3 TIMES DAILY PRN
Qty: 60 TABLET | Refills: 0 | Status: SHIPPED | OUTPATIENT
Start: 2019-03-12 | End: 2019-10-03 | Stop reason: ALTCHOICE

## 2019-03-12 RX ADMIN — KETOROLAC TROMETHAMINE 60 MG: 30 INJECTION, SOLUTION INTRAMUSCULAR; INTRAVENOUS at 08:09

## 2019-03-12 NOTE — PATIENT INSTRUCTIONS
Increase fluid intake as tolerated  Rest and humidification   Continue medications as directed   - Flonase OTC 1-2 sprays each nostril daily PRN post nasal drip   - Mucinex OTC to loosen secretions   Return to office in one week if symptoms persist or worsen     Ice sacral area  Take Flexeril three times a day as needed  Take Advil as directed, always take with food  Call if pain worsens, return to office in 3-4 weeks if pain persists or worsens

## 2019-03-12 NOTE — ASSESSMENT & PLAN NOTE
Advised pt to ice the area and take Advil for inflammation and pain as directed  Recommend she use donut pillow while sitting to relieve pressure on sacral spine

## 2019-08-06 ENCOUNTER — TELEPHONE (OUTPATIENT)
Dept: FAMILY MEDICINE CLINIC | Facility: CLINIC | Age: 44
End: 2019-08-06

## 2019-08-06 DIAGNOSIS — K22.2 ESOPHAGEAL STRICTURE: ICD-10-CM

## 2019-08-06 DIAGNOSIS — Z12.31 ENCOUNTER FOR SCREENING MAMMOGRAM FOR BREAST CANCER: ICD-10-CM

## 2019-08-06 DIAGNOSIS — K21.9 GERD WITHOUT ESOPHAGITIS: ICD-10-CM

## 2019-08-06 DIAGNOSIS — Z13.29 SCREENING FOR HYPOTHYROIDISM: ICD-10-CM

## 2019-08-06 DIAGNOSIS — Z13.6 SCREENING FOR HYPERTENSION: ICD-10-CM

## 2019-08-06 DIAGNOSIS — Z00.00 ROUTINE GENERAL MEDICAL EXAMINATION AT A HEALTH CARE FACILITY: Primary | ICD-10-CM

## 2019-08-06 DIAGNOSIS — Z13.220 SCREENING FOR HYPERLIPIDEMIA: ICD-10-CM

## 2019-08-06 NOTE — TELEPHONE ENCOUNTER
Coming in in October for a CPX with Swati Irving  She needs to go to [facility] for her bloodwork or insurance will not cover it    Can you please place an order for her to go now to have her yearly bw    Call [address] when completed

## 2019-10-03 ENCOUNTER — OFFICE VISIT (OUTPATIENT)
Dept: FAMILY MEDICINE CLINIC | Facility: CLINIC | Age: 44
End: 2019-10-03
Payer: COMMERCIAL

## 2019-10-03 VITALS
HEART RATE: 62 BPM | BODY MASS INDEX: 23.39 KG/M2 | DIASTOLIC BLOOD PRESSURE: 70 MMHG | SYSTOLIC BLOOD PRESSURE: 134 MMHG | TEMPERATURE: 99.3 F | RESPIRATION RATE: 16 BRPM | OXYGEN SATURATION: 99 % | HEIGHT: 67 IN | WEIGHT: 149 LBS

## 2019-10-03 DIAGNOSIS — R00.2 PALPITATIONS: ICD-10-CM

## 2019-10-03 DIAGNOSIS — M25.50 ARTHRALGIA, UNSPECIFIED JOINT: ICD-10-CM

## 2019-10-03 DIAGNOSIS — M54.41 ACUTE RIGHT-SIDED LOW BACK PAIN WITH RIGHT-SIDED SCIATICA: ICD-10-CM

## 2019-10-03 DIAGNOSIS — R53.83 FATIGUE, UNSPECIFIED TYPE: ICD-10-CM

## 2019-10-03 DIAGNOSIS — Z00.00 ENCOUNTER FOR ANNUAL GENERAL MEDICAL EXAMINATION WITHOUT ABNORMAL FINDINGS IN ADULT: Primary | ICD-10-CM

## 2019-10-03 DIAGNOSIS — D22.9 MULTIPLE ATYPICAL NEVI: ICD-10-CM

## 2019-10-03 LAB — ECG INTERP DURING EX: NORMAL MS

## 2019-10-03 PROCEDURE — 93000 ELECTROCARDIOGRAM COMPLETE: CPT | Performed by: NURSE PRACTITIONER

## 2019-10-03 PROCEDURE — 3008F BODY MASS INDEX DOCD: CPT | Performed by: NURSE PRACTITIONER

## 2019-10-03 PROCEDURE — 99396 PREV VISIT EST AGE 40-64: CPT | Performed by: NURSE PRACTITIONER

## 2019-10-03 PROCEDURE — 99213 OFFICE O/P EST LOW 20 MIN: CPT | Performed by: NURSE PRACTITIONER

## 2019-10-03 RX ORDER — LEVONORGESTREL 19.5 MG/1
INTRAUTERINE DEVICE INTRAUTERINE
COMMUNITY
Start: 2019-09-27

## 2019-10-03 NOTE — PROGRESS NOTES
[facility] HEALTH MAINTENANCE OFFICE VISIT  [facility]    NAME: Gerard Smith  AGE: 40 y o  SEX: female  : 1975     DATE: 10/3/2019    Assessment and Plan     1  Encounter for annual general medical examination without abnormal findings in adult  Comments:  Age appropriate screenings and recommendations discussed  2  Palpitations  -     POCT ECG    3  Arthralgia, unspecified joint  -     Lyme Antibody Profile with reflex to WB; Future    4  Fatigue, unspecified type  -     Lyme Antibody Profile with reflex to WB; Future    5  Acute right-sided low back pain with right-sided sciatica  -     Ambulatory referral to Physical Therapy; Future    6  Multiple atypical nevi  -     Ambulatory referral to Dermatology; Future      · Patient Counseling:   · Nutrition: Stressed importance of a well balanced diet, moderation of sodium/saturated fat, caloric balance and sufficient intake of fiber  · Exercise: Stressed the importance of regular exercise with a goal of 150 minutes per week  · Dental Health: Discussed daily flossing and brushing and regular dental visits   · Alcohol Use:  Recommended moderation of alcohol intake  · Injury Prevention: Discussed Safety Belts, Safety Helmets, and Smoke Detectors    · Immunizations reviewed: Risks and Benefits discussed  · Discussed benefits of:  Mammogram , Cervical Cancer screening and Screening labs   BMI Counseling: Body mass index is 23 34 kg/m²  Discussed with patient's BMI with her        Return in about 1 year (around 10/3/2020) for Annual physical     Chief Complaint     Chief Complaint   Patient presents with    Physical Exam     no pap       History of Present Illness     HPI    Well Adult Physical   Patient here for a comprehensive physical exam       Diet and Physical Activity  Diet: well balanced diet  Exercise: infrequently      Depression Screen  PHQ-9 Depression Screening    PHQ-9:    Frequency of the following problems over the past two weeks:       Little interest or pleasure in doing things:  0 - not at all  Feeling down, depressed, or hopeless:  0 - not at all  PHQ-2 Score:  0          General Health  Hearing: Normal:  bilateral  Vision: goes for regular eye exams  Dental: regular dental visits    Reproductive Health  No issues  and Follows with gynecologist    The following portions of the patient's history were reviewed and updated as appropriate: allergies, current medications, past family history, past medical history, past social history, past surgical history and problem list     Review of Systems     Review of Systems   Constitutional: Negative for diaphoresis, fatigue and fever  HENT: Negative for ear pain and hearing loss  Eyes: Negative for pain and visual disturbance  Respiratory: Negative for chest tightness and shortness of breath  Cardiovascular: Positive for palpitations  Negative for chest pain and leg swelling  Gastrointestinal: Negative for abdominal pain, constipation and diarrhea  Genitourinary: Negative for difficulty urinating  Musculoskeletal: Negative for arthralgias and myalgias  Skin: Negative for rash  mx nevi - sees dermatologist   Neurological: Negative for dizziness, numbness and headaches  Psychiatric/Behavioral: Negative for sleep disturbance  Past Medical History     Past Medical History:   Diagnosis Date    Anal fissure     GERD (gastroesophageal reflux disease)        Past Surgical History     History reviewed  No pertinent surgical history      Social History     Social History     Socioeconomic History    Marital status: /Civil Union     Spouse name: None    Number of children: None    Years of education: None    Highest education level: None   Occupational History    None   Social Needs    Financial resource strain: None    Food insecurity:     Worry: None     Inability: None    Transportation needs:     Medical: None Non-medical: None   Tobacco Use    Smoking status: Former Smoker    Smokeless tobacco: Never Used    Tobacco comment: never a smoker as per [employer]   Substance and Sexual Activity    Alcohol use: Yes     Comment: socially; occasionally    Drug use: No    Sexual activity: None   Lifestyle    Physical activity:     Days per week: None     Minutes per session: None    Stress: None   Relationships    Social connections:     Talks on phone: None     Gets together: None     Attends Caoda[employer] service: None     Active member of club or organization: None     Attends meetings of clubs or organizations: None     Relationship status: None    Intimate partner violence:     Fear of current or ex partner: None     Emotionally abused: None     Physically abused: None     Forced sexual activity: None   Other Topics Concern    None   Social History Narrative    Dental care, regularly    Drinks coffee    Occasional caffeine consumption       Family History     Family History   Problem Relation Age of Onset    Hypertension Mother     Stroke Mother     Testicular cancer Father     Mental illness Maternal Aunt     Alzheimer's disease Paternal Grandfather     Schizophrenia Maternal Aunt     Schizophrenia Cousin     Breast cancer Family     Substance Abuse Neg Hx        Current Medications       Current Outpatient Medications:     KYLEENA 19 5 MG intrauterine device, , Disp: , Rfl:      Allergies     Allergies   Allergen Reactions    Bactrim [Sulfamethoxazole-Trimethoprim] Hives    Sulfa Antibiotics Hives       Objective     /70   Pulse 62   Temp 99 3 °F (37 4 °C) (Temporal)   Resp 16   Ht 5' 7" (1 702 m)   Wt 67 6 kg (149 lb)   SpO2 99%   BMI 23 34 kg/m²      Physical Exam   Constitutional: She is oriented to person, place, and time  She appears well-developed and well-nourished  HENT:   Head: Normocephalic and atraumatic     Right Ear: External ear normal    Left Ear: Tympanic membrane and external ear normal    Nose: Nose normal    Mouth/Throat: Uvula is midline, oropharynx is clear and moist and mucous membranes are normal    Eyes: Pupils are equal, round, and reactive to light  Conjunctivae, EOM and lids are normal    Fundoscopic exam:       The right eye shows no arteriolar narrowing and no AV nicking  The left eye shows no arteriolar narrowing and no AV nicking  Neck: Normal range of motion  Neck supple  Normal carotid pulses present  Carotid bruit is not present  No thyroid mass and no thyromegaly present  Cardiovascular: Normal rate, regular rhythm, S1 normal, S2 normal, normal heart sounds and intact distal pulses  Exam reveals no gallop, no S3, no S4, no distant heart sounds and no friction rub  No murmur heard  Pulmonary/Chest: Effort normal and breath sounds normal  No respiratory distress  She has no decreased breath sounds  She has no wheezes  She has no rhonchi  She has no rales  Abdominal: Soft  Bowel sounds are normal  She exhibits no distension  There is no hepatosplenomegaly  There is no tenderness  Musculoskeletal: Normal range of motion  She exhibits no edema  Right shoulder: Normal         Left shoulder: Normal         Right elbow: Normal        Left elbow: Normal         Right hip: Normal         Left hip: Normal         Right knee: Normal         Left knee: Normal         Right ankle: Normal         Left ankle: Normal         Cervical back: Normal         Lumbar back: She exhibits tenderness  Lymphadenopathy:     She has no cervical adenopathy  Right: No supraclavicular adenopathy present  Neurological: She is alert and oriented to person, place, and time  She has normal strength and normal reflexes  No cranial nerve deficit or sensory deficit  Coordination normal    Skin: Skin is warm and dry  No rash noted  No erythema  Mx nevi, back   Psychiatric: She has a normal mood and affect   Her speech is normal and behavior is normal  Judgment and thought content normal          Ankit Cummings, [address] [facility],[address]

## 2019-10-03 NOTE — PATIENT INSTRUCTIONS

## 2019-10-03 NOTE — PROGRESS NOTES
Assessment/Plan:    1  Arthralgia, unspecified joint  -     Lyme Antibody Profile with reflex to WB; Future    2  Fatigue, unspecified type  -     Lyme Antibody Profile with reflex to WB; Future    3  Acute right-sided low back pain with right-sided sciatica  -     Ambulatory referral to Physical Therapy; Future    Return in about 1 year (around 10/3/2020) for Annual physical     Subjective:      Patient ID: Isaias Osullivan is a 40 y o  female  Chief Complaint   Patient presents with    Physical Exam     no pap       Haleynaomi Rosa is a 40year old female who presents to the office for annual physical  Additionally, she reports low back pain s/p fall approx 6-7 months ago  States she feels a pulling sensation when bending over  She also reports referred pain down her right leg  Denies numbness or tingling in the feet  Reports joint pain in BL shoulders, elbows and knees with increased fatigue over the past 2-3 weeks  Denies tick bite or rash  The following portions of the patient's history were reviewed and updated as appropriate: allergies, current medications, past family history, past medical history, past social history, past surgical history and problem list     Review of Systems   Constitutional: Negative for diaphoresis, fatigue and fever  HENT: Negative for ear pain and hearing loss  Eyes: Negative for pain and visual disturbance  Respiratory: Negative for chest tightness and shortness of breath  Cardiovascular: Negative for chest pain, palpitations and leg swelling  Gastrointestinal: Negative for abdominal pain, constipation and diarrhea  Genitourinary: Negative for difficulty urinating  Musculoskeletal: Positive for arthralgias and back pain  Negative for joint swelling and myalgias  Skin: Negative for rash  Neurological: Negative for dizziness, numbness and headaches  Psychiatric/Behavioral: Negative for sleep disturbance           Current Outpatient Medications   Medication Sig Dispense Refill    KYLEENA 19 5 MG intrauterine device        No current facility-administered medications for this visit  Objective:    /70   Pulse 62   Temp 99 3 °F (37 4 °C) (Temporal)   Resp 16   Ht 5' 7" (1 702 m)   Wt 67 6 kg (149 lb)   SpO2 99%   BMI 23 34 kg/m²        Physical Exam   Constitutional: She is oriented to person, place, and time  She appears well-developed and well-nourished  HENT:   Head: Normocephalic and atraumatic  Right Ear: External ear normal    Left Ear: Tympanic membrane and external ear normal    Nose: Nose normal    Mouth/Throat: Uvula is midline, oropharynx is clear and moist and mucous membranes are normal    Eyes: Pupils are equal, round, and reactive to light  Conjunctivae, EOM and lids are normal    Fundoscopic exam:       The right eye shows no arteriolar narrowing and no AV nicking  The left eye shows no arteriolar narrowing and no AV nicking  Neck: Normal range of motion  Neck supple  Normal carotid pulses present  Carotid bruit is not present  No thyroid mass and no thyromegaly present  Cardiovascular: Normal rate, regular rhythm, S1 normal, S2 normal, normal heart sounds and intact distal pulses  Exam reveals no gallop, no S3, no S4, no distant heart sounds and no friction rub  No murmur heard  Pulmonary/Chest: Effort normal and breath sounds normal  No respiratory distress  She has no decreased breath sounds  She has no wheezes  She has no rhonchi  She has no rales  Abdominal: Soft  Bowel sounds are normal  She exhibits no distension  There is no hepatosplenomegaly  There is no tenderness  Musculoskeletal: Normal range of motion  She exhibits no edema          Right shoulder: Normal         Left shoulder: Normal         Right elbow: Normal        Left elbow: Normal         Right hip: Normal         Left hip: Normal         Right knee: Normal         Left knee: Normal         Right ankle: Normal         Left ankle: Normal  Cervical back: Normal         Lumbar back: She exhibits tenderness  Lymphadenopathy:     She has no cervical adenopathy  Right: No supraclavicular adenopathy present  Neurological: She is alert and oriented to person, place, and time  She has normal strength and normal reflexes  No cranial nerve deficit or sensory deficit  Coordination normal    Negative straight leg raises  Skin: Skin is warm and dry  No rash noted  No erythema  Psychiatric: She has a normal mood and affect   Her speech is normal and behavior is normal  Judgment and thought content normal            JOSE Mann

## 2019-10-15 DIAGNOSIS — R00.2 PALPITATIONS: ICD-10-CM

## 2019-10-15 DIAGNOSIS — I77.810 AORTIC ROOT DILATATION (HCC): Primary | ICD-10-CM

## 2019-11-21 ENCOUNTER — OFFICE VISIT (OUTPATIENT)
Dept: FAMILY MEDICINE CLINIC | Facility: CLINIC | Age: 44
End: 2019-11-21
Payer: COMMERCIAL

## 2019-11-21 VITALS
SYSTOLIC BLOOD PRESSURE: 108 MMHG | OXYGEN SATURATION: 98 % | RESPIRATION RATE: 16 BRPM | HEART RATE: 70 BPM | HEIGHT: 67 IN | BODY MASS INDEX: 23.23 KG/M2 | WEIGHT: 148 LBS | DIASTOLIC BLOOD PRESSURE: 60 MMHG | TEMPERATURE: 98.5 F

## 2019-11-21 DIAGNOSIS — R19.7 DIARRHEA, UNSPECIFIED TYPE: Primary | ICD-10-CM

## 2019-11-21 LAB
SL AMB  POCT GLUCOSE, UA: 0
SL AMB LEUKOCYTE ESTERASE,UA: 0
SL AMB POCT BILIRUBIN,UA: 0
SL AMB POCT BLOOD,UA: 0
SL AMB POCT CLARITY,UA: CLEAR
SL AMB POCT COLOR,UA: YELLOW
SL AMB POCT KETONES,UA: 0
SL AMB POCT NITRITE,UA: 0
SL AMB POCT PH,UA: 6.5
SL AMB POCT SPECIFIC GRAVITY,UA: 1.01
SL AMB POCT URINE PROTEIN: 0
SL AMB POCT UROBILINOGEN: 0

## 2019-11-21 PROCEDURE — 81003 URINALYSIS AUTO W/O SCOPE: CPT | Performed by: FAMILY MEDICINE

## 2019-11-21 PROCEDURE — 99213 OFFICE O/P EST LOW 20 MIN: CPT | Performed by: FAMILY MEDICINE

## 2019-11-21 PROCEDURE — 1036F TOBACCO NON-USER: CPT | Performed by: FAMILY MEDICINE

## 2019-11-21 RX ORDER — CIPROFLOXACIN 500 MG/1
500 TABLET, FILM COATED ORAL EVERY 12 HOURS SCHEDULED
Qty: 14 TABLET | Refills: 0 | Status: SHIPPED | OUTPATIENT
Start: 2019-11-21 | End: 2019-11-28

## 2019-11-21 RX ORDER — DICYCLOMINE HYDROCHLORIDE 10 MG/1
10 CAPSULE ORAL 3 TIMES DAILY PRN
Qty: 30 CAPSULE | Refills: 0 | Status: SHIPPED | OUTPATIENT
Start: 2019-11-21 | End: 2020-09-02 | Stop reason: ALTCHOICE

## 2019-11-21 NOTE — PROGRESS NOTES
Assessment/Plan:    No problem-specific Assessment & Plan notes found for this encounter  Diagnoses and all orders for this visit:    Diarrhea, unspecified type  -     POCT urine dip auto non-scope  -     ciprofloxacin (CIPRO) 500 mg tablet; Take 1 tablet (500 mg total) by mouth every 12 (twelve) hours for 7 days  -     dicyclomine (BENTYL) 10 mg capsule; Take 1 capsule (10 mg total) by mouth 3 (three) times a day as needed (CRAMPS)          Patient Instructions   PLENTY OF FLUIDS  BLAND DIET  MEDICATION AS DIRECTED    RV IF SYMPTOMS PERSIST OR WORSEN      Return if symptoms worsen or fail to improve, for Next scheduled follow up  Subjective:      Patient ID: Tito Adamson is a 40 y o  female  Chief Complaint   Patient presents with    Diarrhea     x 2 weeks       Diarrhea    This is a new problem  The current episode started 1 to 4 weeks ago  The problem occurs 2 to 4 times per day  The problem has been unchanged  The stool consistency is described as mucous and watery  The patient states that diarrhea does not awaken her from sleep  Associated symptoms include abdominal pain, bloating and increased flatus  Pertinent negatives include no arthralgias, chills, coughing, fever, headaches, myalgias, sweats, URI, vomiting or weight loss  Nothing aggravates the symptoms  Risk factors include travel to endemic area  She has tried anti-motility drug for the symptoms  The treatment provided mild relief  There is no history of bowel resection, inflammatory bowel disease, irritable bowel syndrome, malabsorption or short gut syndrome  The following portions of the patient's history were reviewed and updated as appropriate: allergies, current medications, past family history, past medical history, past social history, past surgical history and problem list     Review of Systems   Constitutional: Negative for chills, fever and weight loss  HENT: Negative for congestion and sore throat      Eyes: Negative for discharge  Respiratory: Negative for cough and chest tightness  Cardiovascular: Negative for chest pain and palpitations  Gastrointestinal: Positive for abdominal pain, bloating, diarrhea and flatus  Negative for nausea and vomiting  Musculoskeletal: Negative for arthralgias, joint swelling and myalgias  Neurological: Negative for numbness and headaches  Psychiatric/Behavioral: The patient is not nervous/anxious  Current Outpatient Medications   Medication Sig Dispense Refill    ciprofloxacin (CIPRO) 500 mg tablet Take 1 tablet (500 mg total) by mouth every 12 (twelve) hours for 7 days 14 tablet 0    dicyclomine (BENTYL) 10 mg capsule Take 1 capsule (10 mg total) by mouth 3 (three) times a day as needed (CRAMPS) 30 capsule 0    KYLEENA 19 5 MG intrauterine device        No current facility-administered medications for this visit  Objective:    /60   Pulse 70   Temp 98 5 °F (36 9 °C) (Temporal)   Resp 16   Ht 5' 7" (1 702 m)   Wt 67 1 kg (148 lb)   SpO2 98%   BMI 23 18 kg/m²        Physical Exam   Constitutional: She is oriented to person, place, and time  She appears well-developed and well-nourished  HENT:   Head: Normocephalic and atraumatic  Eyes: Pupils are equal, round, and reactive to light  Conjunctivae and EOM are normal  Right eye exhibits no discharge  Left eye exhibits no discharge  Neck: Normal range of motion  Neck supple  No thyromegaly present  Cardiovascular: Normal rate, regular rhythm and normal heart sounds  No murmur heard  Pulmonary/Chest: Effort normal and breath sounds normal  No respiratory distress  She has no wheezes  She has no rales  Abdominal: Soft  Bowel sounds are normal  There is no tenderness  Musculoskeletal: Normal range of motion  She exhibits no edema or tenderness  Lymphadenopathy:     She has no cervical adenopathy  Neurological: She is alert and oriented to person, place, and time     Skin: Skin is warm and dry  No rash noted  No erythema  Psychiatric: She has a normal mood and affect   Her behavior is normal  Judgment and thought content normal               Anoop Mullen MD

## 2020-03-31 ENCOUNTER — TELEPHONE (OUTPATIENT)
Dept: ADMINISTRATIVE | Facility: OTHER | Age: 45
End: 2020-03-31

## 2020-03-31 NOTE — LETTER
Procedure Request Form: Mammogram      Date Requested: 20  Patient: Gayle Mercado  Patient : 1975   Referring Provider: Isabela Cortez, MD        Date of Procedure ______________________________       The above patient has informed us that they have completed their   most recent Mammogram at your facility  Please complete   this form and attach all corresponding procedure reports/results  Comments __________________________________________________________  ____________________________________________________________________  ____________________________________________________________________  ____________________________________________________________________    Facility Completing Procedure _________________________________________    Form Completed By (print name) _______________________________________      Signature __________________________________________________________      These reports are needed for  compliance    Please fax this completed form and a copy of the procedure report to our office located at [place] [address] as soon as possible to [phone] allyssa Singh: Phone [phone]    We thank you for your assistance in treating our mutual patient

## 2020-03-31 NOTE — TELEPHONE ENCOUNTER
----- Message from Lilly Booth LPN sent at [id number]  3:32 PM EDT -----  Regarding: Sarah rosales  Contact: [phone]  03/30/20 3:33 PM    Heidi, our patient Denisha Arce has had Mammogram completed/performed  Please assist in updating the patient chart by making an External outreach to [facility] located in [place] [address]      Thank you,  Lilly Booth LPN  [address]

## 2020-03-31 NOTE — TELEPHONE ENCOUNTER
Upon review of the In Basket request and the patient's chart, initial outreach has been made via fax, please see Contacts section for details  A second outreach attempt will be made within 5 business days      Thank you  Zandra Johnson

## 2020-08-11 DIAGNOSIS — Z00.00 ENCOUNTER FOR ANNUAL PHYSICAL EXAM: Primary | ICD-10-CM

## 2020-08-11 DIAGNOSIS — Z13.220 SCREENING, LIPID: ICD-10-CM

## 2020-08-11 DIAGNOSIS — Z11.4 ENCOUNTER FOR SCREENING FOR HUMAN IMMUNODEFICIENCY VIRUS (HIV): ICD-10-CM

## 2020-08-11 DIAGNOSIS — Z13.29 SCREENING FOR THYROID DISORDER: ICD-10-CM

## 2020-08-11 DIAGNOSIS — Z13.6 SCREENING FOR HYPERTENSION: ICD-10-CM

## 2020-08-11 NOTE — PROGRESS NOTES
CPX Blood work   [facility]  9-2-20

## 2020-08-26 ENCOUNTER — TELEPHONE (OUTPATIENT)
Dept: FAMILY MEDICINE CLINIC | Facility: CLINIC | Age: 45
End: 2020-08-26

## 2020-08-26 DIAGNOSIS — Z13.29 SCREENING FOR THYROID DISORDER: Primary | ICD-10-CM

## 2020-08-26 DIAGNOSIS — Z80.8 FAMILY HISTORY OF THYROID CANCER: ICD-10-CM

## 2020-08-26 NOTE — TELEPHONE ENCOUNTER
I am very sorry to hear that  Her mother will do well I'm sure  Orders in chart   Will examine thyroid at physical exam     Chela Rojo

## 2020-08-26 NOTE — TELEPHONE ENCOUNTER
Her mother was just diagnosed with Thyroid cancer  She wanted to have the whole BW for the Thyroid      Call Patient

## 2020-09-02 ENCOUNTER — OFFICE VISIT (OUTPATIENT)
Dept: FAMILY MEDICINE CLINIC | Facility: CLINIC | Age: 45
End: 2020-09-02
Payer: COMMERCIAL

## 2020-09-02 VITALS
WEIGHT: 147 LBS | RESPIRATION RATE: 16 BRPM | HEIGHT: 68 IN | SYSTOLIC BLOOD PRESSURE: 108 MMHG | BODY MASS INDEX: 22.28 KG/M2 | HEART RATE: 64 BPM | DIASTOLIC BLOOD PRESSURE: 60 MMHG | OXYGEN SATURATION: 99 % | TEMPERATURE: 99.1 F

## 2020-09-02 DIAGNOSIS — Z00.00 ENCOUNTER FOR ANNUAL GENERAL MEDICAL EXAMINATION WITHOUT ABNORMAL FINDINGS IN ADULT: Primary | ICD-10-CM

## 2020-09-02 DIAGNOSIS — Z87.898 HISTORY OF PALPITATIONS: ICD-10-CM

## 2020-09-02 DIAGNOSIS — I34.0 NONRHEUMATIC MITRAL VALVE REGURGITATION: ICD-10-CM

## 2020-09-02 PROBLEM — S30.0XXA SACRAL CONTUSION, INITIAL ENCOUNTER: Status: RESOLVED | Noted: 2019-03-12 | Resolved: 2020-09-02

## 2020-09-02 PROBLEM — R19.7 DIARRHEA: Status: RESOLVED | Noted: 2019-11-21 | Resolved: 2020-09-02

## 2020-09-02 LAB
ECG INTERP DURING EX: NORMAL MS
SL AMB  POCT GLUCOSE, UA: 0
SL AMB LEUKOCYTE ESTERASE,UA: 0
SL AMB POCT BILIRUBIN,UA: 0
SL AMB POCT BLOOD,UA: 0
SL AMB POCT CLARITY,UA: CLEAR
SL AMB POCT COLOR,UA: YELLOW
SL AMB POCT KETONES,UA: 0
SL AMB POCT NITRITE,UA: 0
SL AMB POCT PH,UA: 9
SL AMB POCT SPECIFIC GRAVITY,UA: 1.01
SL AMB POCT URINE PROTEIN: 0
SL AMB POCT UROBILINOGEN: 0

## 2020-09-02 PROCEDURE — 81003 URINALYSIS AUTO W/O SCOPE: CPT | Performed by: NURSE PRACTITIONER

## 2020-09-02 PROCEDURE — 93000 ELECTROCARDIOGRAM COMPLETE: CPT | Performed by: NURSE PRACTITIONER

## 2020-09-02 PROCEDURE — 99396 PREV VISIT EST AGE 40-64: CPT | Performed by: NURSE PRACTITIONER

## 2020-09-02 PROCEDURE — 3725F SCREEN DEPRESSION PERFORMED: CPT | Performed by: NURSE PRACTITIONER

## 2020-09-02 PROCEDURE — 1036F TOBACCO NON-USER: CPT | Performed by: NURSE PRACTITIONER

## 2020-09-02 RX ORDER — CLOBETASOL PROPIONATE 0.5 MG/G
AEROSOL, FOAM TOPICAL
COMMUNITY
Start: 2020-06-04 | End: 2020-09-02 | Stop reason: ALTCHOICE

## 2020-09-02 NOTE — PROGRESS NOTES
FAMILY PRACTICE HEALTH MAINTENANCE OFFICE VISIT  [facility]  [facility]    NAME: James Saenz  AGE: 39 y o  SEX: female  : 1975     DATE: 2020    Assessment and Plan     1  Encounter for annual general medical examination without abnormal findings in adult  Comments:  Age appropriate screenings and recommendations discussed  2  History of palpitations    3  Nonrheumatic mitral valve regurgitation  Assessment & Plan:  Stable  Reviewed ECHO from previous year  Orders:  -     POCT ECG      · Patient Counseling:   · Nutrition: Stressed importance of a well balanced diet, moderation of sodium/saturated fat, caloric balance and sufficient intake of fiber  · Exercise: Stressed the importance of regular exercise with a goal of 150 minutes per week  · Dental Health: Discussed daily flossing and brushing and regular dental visits   · Alcohol Use:  Recommended moderation of alcohol intake  · Injury Prevention: Discussed Safety Belts, Safety Helmets, and Smoke Detectors    · Immunizations reviewed: Up To Date  · Discussed benefits of:  Mammogram , Cervical Cancer screening and Screening labs   BMI Counseling: Body mass index is 22 35 kg/m²  Discussed with patient's BMI with her  Return in about 3 months (around 2020) for Recheck      Chief Complaint     Chief Complaint   Patient presents with    Physical Exam       History of Present Illness     HPI    Well Adult Physical   Patient here for a comprehensive physical exam       Diet and Physical Activity  Diet: well balanced diet  Exercise: intermittently      Depression Screen  PHQ-9 Depression Screening    PHQ-9:    Frequency of the following problems over the past two weeks:       Little interest or pleasure in doing things:  0 - not at all  Feeling down, depressed, or hopeless:  1 - several days  PHQ-2 Score:  1          General Health  Hearing: Normal:  bilateral  Vision: no vision problems and goes for regular eye exams  Dental: regular dental visits    Reproductive Health  No issues , Regular Periods and Follows with gynecologist      The following portions of the patient's history were reviewed and updated as appropriate: allergies, current medications, past family history, past medical history, past social history, past surgical history and problem list     Review of Systems     Review of Systems   Constitutional: Negative for diaphoresis, fatigue and fever  HENT: Negative for ear pain and hearing loss  Eyes: Negative for pain and visual disturbance  Respiratory: Negative for chest tightness and shortness of breath  Cardiovascular: Negative for chest pain, palpitations and leg swelling  Gastrointestinal: Negative for abdominal pain, constipation and diarrhea  Genitourinary: Negative for difficulty urinating  Musculoskeletal: Negative for arthralgias and myalgias  Skin: Negative for rash  Neurological: Negative for dizziness, numbness and headaches  Psychiatric/Behavioral: Negative for sleep disturbance  Past Medical History     Past Medical History:   Diagnosis Date    Anal fissure     GERD (gastroesophageal reflux disease)        Past Surgical History     History reviewed  No pertinent surgical history      Social History     Social History     Socioeconomic History    Marital status: /Civil Union     Spouse name: None    Number of children: None    Years of education: None    Highest education level: None   Occupational History    None   Social Needs    Financial resource strain: None    Food insecurity     Worry: None     Inability: None    Transportation needs     Medical: None     Non-medical: None   Tobacco Use    Smoking status: Former Smoker    Smokeless tobacco: Never Used    Tobacco comment: never a smoker as per [employer]   Substance and Sexual Activity    Alcohol use: Yes     Comment: socially; occasionally    Drug use: No    Sexual activity: None   Lifestyle    Physical activity     Days per week: None     Minutes per session: None    Stress: None   Relationships    Social connections     Talks on phone: None     Gets together: None     Attends Islam service: None     Active member of club or organization: None     Attends meetings of clubs or organizations: None     Relationship status: None    Intimate partner violence     Fear of current or ex partner: None     Emotionally abused: None     Physically abused: None     Forced sexual activity: None   Other Topics Concern    None   Social History Narrative    Dental care, regularly    Drinks coffee    Occasional caffeine consumption       Family History     Family History   Problem Relation Age of Onset    Hypertension Mother     Stroke Mother     Testicular cancer Father     Mental illness Maternal Aunt     Alzheimer's disease Paternal Grandfather     Schizophrenia Maternal Aunt     Schizophrenia Cousin     Breast cancer Family     Substance Abuse Neg Hx        Current Medications       Current Outpatient Medications:     KYLEENA 19 5 MG intrauterine device, , Disp: , Rfl:      Allergies     Allergies   Allergen Reactions    Bactrim [Sulfamethoxazole-Trimethoprim] Hives    Sulfa Antibiotics Hives       Objective     /60   Pulse 64   Temp 99 1 °F (37 3 °C) (Temporal)   Resp 16   Ht 5' 8" (1 727 m)   Wt 66 7 kg (147 lb)   SpO2 99%   BMI 22 35 kg/m²      Physical Exam  Constitutional:       General: She is not in acute distress  Appearance: She is well-developed and normal weight  She is not ill-appearing  HENT:      Head: Normocephalic and atraumatic  Right Ear: Tympanic membrane, ear canal and external ear normal       Left Ear: Tympanic membrane, ear canal and external ear normal    Eyes:      General: Lids are normal       Extraocular Movements: Extraocular movements intact        Conjunctiva/sclera: Conjunctivae normal       Pupils: Pupils are equal, round, and reactive to light  Pupils are equal    Neck:      Musculoskeletal: Normal range of motion and neck supple  Thyroid: No thyroid mass or thyromegaly  Vascular: Normal carotid pulses  No carotid bruit  Cardiovascular:      Rate and Rhythm: Normal rate and regular rhythm  Heart sounds: Normal heart sounds, S1 normal and S2 normal  Heart sounds not distant  No murmur  No friction rub  No gallop  No S3 or S4 sounds  Pulmonary:      Effort: Pulmonary effort is normal  No respiratory distress  Breath sounds: Normal breath sounds  No decreased breath sounds, wheezing, rhonchi or rales  Abdominal:      General: Bowel sounds are normal  There is no distension  Palpations: Abdomen is soft  Tenderness: There is no abdominal tenderness  Musculoskeletal: Normal range of motion  Right shoulder: Normal       Left shoulder: Normal       Right elbow: Normal      Left elbow: Normal       Right hip: Normal       Left hip: Normal       Right knee: Normal       Left knee: Normal       Right ankle: Normal       Left ankle: Normal       Cervical back: Normal    Lymphadenopathy:      Cervical: No cervical adenopathy  Upper Body:      Right upper body: No supraclavicular adenopathy  Skin:     General: Skin is warm and dry  Findings: No erythema or rash  Neurological:      Mental Status: She is alert and oriented to person, place, and time  Cranial Nerves: No cranial nerve deficit  Sensory: No sensory deficit  Coordination: Coordination normal       Deep Tendon Reflexes: Reflexes are normal and symmetric  Psychiatric:         Speech: Speech normal          Behavior: Behavior normal          Thought Content:  Thought content normal          Judgment: Judgment normal            [facility], [address] [place] [address],[address]

## 2020-09-04 ENCOUNTER — TELEPHONE (OUTPATIENT)
Dept: FAMILY MEDICINE CLINIC | Facility: CLINIC | Age: 45
End: 2020-09-04

## 2020-09-04 DIAGNOSIS — B35.6 TINEA CRURIS: Primary | ICD-10-CM

## 2020-09-04 DIAGNOSIS — B35.9 TINEA: ICD-10-CM

## 2020-09-04 RX ORDER — CLOTRIMAZOLE AND BETAMETHASONE DIPROPIONATE 10; .64 MG/G; MG/G
CREAM TOPICAL 2 TIMES DAILY
Qty: 30 G | Refills: 0 | Status: SHIPPED | OUTPATIENT
Start: 2020-09-04 | End: 2021-07-20

## 2020-09-04 NOTE — TELEPHONE ENCOUNTER
Flaco Jackson returned Aundrea's call for results  Informed Dena of her results per Isabel Bassett:   Please notify Flaco Jackson that her thyroid is normal  No issues at this time  Her LDL Cholesterol is elevated at 130  Recommend red rice yeast supplementation and recheck in 6 months  Heart healthy nutrition        Cyril JOSE Chua     Also Flaco Jackson states Isabel Bassett was going to call in a fungal cream for her foot    Please call into [facility]  Thanks

## 2020-12-02 ENCOUNTER — TELEPHONE (OUTPATIENT)
Dept: FAMILY MEDICINE CLINIC | Facility: CLINIC | Age: 45
End: 2020-12-02

## 2021-03-15 ENCOUNTER — TELEPHONE (OUTPATIENT)
Dept: FAMILY MEDICINE CLINIC | Facility: CLINIC | Age: 46
End: 2021-03-15

## 2021-03-15 DIAGNOSIS — H57.10 PAIN IN EYE, UNSPECIFIED LATERALITY: Primary | ICD-10-CM

## 2021-06-03 ENCOUNTER — TELEPHONE (OUTPATIENT)
Dept: FAMILY MEDICINE CLINIC | Facility: CLINIC | Age: 46
End: 2021-06-03

## 2021-06-03 DIAGNOSIS — Z00.00 PREVENTATIVE HEALTH CARE: Primary | ICD-10-CM

## 2021-06-03 DIAGNOSIS — Z13.0 SCREENING FOR DEFICIENCY ANEMIA: ICD-10-CM

## 2021-06-03 DIAGNOSIS — Z13.1 SCREENING FOR DIABETES MELLITUS: ICD-10-CM

## 2021-06-03 DIAGNOSIS — Z13.220 SCREENING FOR LIPID DISORDERS: ICD-10-CM

## 2021-06-03 DIAGNOSIS — Z87.898 HISTORY OF PALPITATIONS: ICD-10-CM

## 2021-06-03 DIAGNOSIS — Z13.29 SCREENING FOR THYROID DISORDER: ICD-10-CM

## 2021-06-03 NOTE — TELEPHONE ENCOUNTER
Coming in on 7/20 for cpx    Please place bw orders    Has to go to [facility] for it to be free

## 2021-07-08 NOTE — TELEPHONE ENCOUNTER
[facility]  ED  EMERGENCY DEPARTMENT ENCOUNTER        Pt Name: Kyara Emerson Sr. MRN: [id number]  Birthdate 1950  Date of evaluation: 7/8/2021  Provider: RODOLFO Mckenzie  PCP: Floridalma Atkins MD  Note Started: 10:32 AM EDT       FATMATA. I have evaluated this patient. My supervising physician was available for consultation. CHIEF COMPLAINT       Chief Complaint   Patient presents with    Urinary Catheter Problem     catheter placed yesterday. pt believes it's not working       HISTORY OF PRESENT ILLNESS   (Location, Timing/Onset, Context/Setting, Quality, Duration, Modifying Factors, Severity, Associated Signs and Symptoms)  Note limiting factors. Chief Complaint: Urinary catheter dysfunction     Kyara Emerson Sr. is a 79 y.o. male who presents to the emergency department with a chief complaint of urinary catheter dysfunction. Patient reports that a urinary catheter replaced yesterday. He gets them replaced monthly. He notes that since yesterday evening he has not had urine flowing from the catheter. Associated suprapubic fullness and pain. Concerned that the catheter is not working properly. Denies fever, chest pain, shortness of breath abdominal pain, nausea, vomiting, numbness, weakness. Nursing Notes were all reviewed and agreed with or any disagreements were addressed in the HPI. REVIEW OF SYSTEMS    (2-9 systems for level 4, 10 or more for level 5)     Review of Systems   Constitutional: Negative for fever. HENT: Negative for sore throat. Eyes: Negative for pain and visual disturbance. Respiratory: Negative for cough and shortness of breath. Cardiovascular: Negative for chest pain. Gastrointestinal: Negative for abdominal pain, nausea and vomiting. Genitourinary: Positive for decreased urine volume. Negative for dysuria, frequency and hematuria. Musculoskeletal: Negative for back pain and neck pain. Skin: Negative for rash. Upon review of the In Basket request we were able to locate, review, and update the patient chart as requested for Mammogram     Any additional questions or concerns should be emailed to the Practice Liaisons via [email or web address] email, please do not reply via In Basket      Thank you  Priti Salamanca Neurological: Negative for weakness, numbness and headaches. Psychiatric/Behavioral: Negative for confusion. Positives and Pertinent negatives as per HPI. Except as noted above in the ROS, all other systems were reviewed and negative.        PAST MEDICAL HISTORY     Past Medical History:   Diagnosis Date    Acute respiratory failure with hypoxia and hypercapnia ([facility] 09/24/2018    Anemia due to blood loss, acute 10/12/2020    Bacteremia due to Escherichia coli 10/12/2020    Calculus of gallbladder with acute cholecystitis without obstruction 09/24/2018    CHF (congestive heart failure) ([facility] 08/2019    Choledecholithiasis 09/24/2018    DVT (deep venous thrombosis) ([facility] 07/14/2018    GI bleed from duodenal ulcer 08/06/2018    Malignant neoplasm of kidney excluding renal pelvis ([facility] 04/08/2014    Retroperitoneal bleed 10/09/2020    UTI 10/12/2020         SURGICAL HISTORY     Past Surgical History:   Procedure Laterality Date    CARDIAC CATHETERIZATION  09/06/2019    COLONOSCOPY N/A 04/09/2019    COLONOSCOPY WITH BIOPSY performed by Estephania Ha MD at [place] [address]  10/19/2020    CT BONE MARROW BIOPSY 10/19/2020 [facility] SCAN    IVC FILTER INSERTION  10/13/2020    KIDNEY SURGERY  04/07/2014    removal of tumor (not total nephrectomy)    MS EGD TRANSORAL BIOPSY SINGLE/MULTIPLE  09/28/2018    Shatzki's ring and gastritis    SIGMOIDOSCOPY      UPPER GASTROINTESTINAL ENDOSCOPY  08/06/2018    bleeding duodenal ulcer    UPPER GASTROINTESTINAL ENDOSCOPY N/A 04/08/2019    EGD WITH ANESTHESIA performed by Estephania Ha MD at [address] [place] [address] N/A 04/12/2019    ESOPHAGEAL CAPSULE ENDOSCOPY performed by Estephania Ha MD at [address] [place] [address] Medication List as of 7/8/2021  9:55 AM      CONTINUE these medications which have NOT CHANGED    Details   lisinopril (PRINIVIL;ZESTRIL) 20 MG tablet Take 20 mg by mouth dailyHistorical Med      ketoconazole (NIZORAL) 2 % cream Apply to rash in skin folds twice daily as needed. , Disp-60 g, R-1, Normal      magnesium hydroxide (MILK OF MAGNESIA) 400 MG/5ML suspension Take by mouth daily as needed for ConstipationHistorical Med      potassium chloride (MICRO-K) 10 MEQ extended release capsule Take 20 mEq by mouth 3 times daily Historical Med      pantoprazole (PROTONIX) 40 MG tablet Take 40 mg by mouth dailyHistorical Med      vitamin D (CHOLECALCIFEROL) 1000 UNIT TABS tablet Take 1,000 Units by mouth 2 times dailyHistorical Med      folic acid (FOLVITE) 1 MG tablet Take 1 tablet by mouth daily, Disp-30 tablet, R-3Normal               ALLERGIES     Patient has no known allergies.     FAMILYHISTORY       Family History   Problem Relation Age of Onset    Arthritis Mother     Atrial Fibrillation Mother     Breast Cancer Mother     High Blood Pressure Mother     Anemia Father     Arthritis Father     Cancer Father 80        Ear CA/ tumor removed     Hearing Loss Father     Arthritis Sister     Diabetes Maternal Aunt     Arthritis Maternal Aunt     Early Death Maternal Uncle          in early 42's     Depression Maternal Uncle     Other Maternal Uncle         Suicide    Heart Disease Paternal Aunt     Arthritis Maternal Grandmother           SOCIAL HISTORY       Social History     Tobacco Use    Smoking status: Never Smoker    Smokeless tobacco: Never Used   Vaping Use    Vaping Use: Never used   Substance Use Topics    Alcohol use: No    Drug use: No       SCREENINGS             PHYSICAL EXAM    (up to 7 for level 4, 8 or more for level 5)     ED Triage Vitals   BP Temp Temp Source Pulse Resp SpO2 Height Weight   21 0803 21 0803 21 0803 21 0803 21 0803 21 0803 21 0801 21 0801   (!) 166/112 99 °F (37.2 °C) Oral 122 18 95 % 5' 4\" (1.626 m) (!) 300 lb 6.4 oz (136.3 kg)       Physical Exam  Vitals reviewed. Constitutional:       Appearance: He is not diaphoretic. HENT:      Nose: No congestion or rhinorrhea. Eyes:      General: No scleral icterus. Conjunctiva/sclera: Conjunctivae normal.   Pulmonary:      Effort: Pulmonary effort is normal. No respiratory distress. Breath sounds: No stridor. Abdominal:      General: There is no distension. Palpations: Abdomen is soft. Tenderness: There is no abdominal tenderness. There is no right CVA tenderness, left CVA tenderness, guarding or rebound. Genitourinary:     Penis: Normal.       Testes: Normal.      Comments: Mills catheter with urine in Mills bag but none coming through catheter itself. Musculoskeletal:         General: Normal range of motion. Cervical back: Normal range of motion and neck supple. Skin:     General: Skin is warm and dry. Neurological:      Mental Status: He is alert and oriented to person, place, and time. Psychiatric:         Mood and Affect: Mood normal.         Behavior: Behavior normal.         DIAGNOSTIC RESULTS   LABS:    Labs Reviewed - No data to display    When ordered only abnormal lab results are displayed. All other labs were within normal range or not returned as of this dictation. EKG: When ordered, EKG's are interpreted by the Emergency Department Physician in the absence of a cardiologist.  Please see their note for interpretation of EKG. RADIOLOGY:   Non-plain film images such as CT, Ultrasound and MRI are read by the radiologist. Plain radiographic images are visualized and preliminarily interpreted by the ED Provider with the below findings:        Interpretation per the Radiologist below, if available at the time of this note:    No orders to display     No results found.         PROCEDURES   Unless otherwise noted below, none     Procedures    CRITICAL CARE TIME   N/A    CONSULTS:  None      EMERGENCY DEPARTMENT COURSE and DIFFERENTIAL DIAGNOSIS/MDM:   Vitals: Vitals:    07/08/21 0801 07/08/21 0803 07/08/21 0922 07/08/21 1028   BP:  (!) 166/112 (!) 190/99 (!) 146/81   Pulse:  122 102 108   Resp:  18 19 19   Temp:  99 °F (37.2 °C)     TempSrc:  Oral     SpO2:  95% 94% 95%   Weight: (!) 300 lb 6.4 oz (136.3 kg)      Height: 5' 4\" (1.626 m)          Patient was given the following medications:  Medications - No data to display        72-year-old male presents emergency department with concern for urinary catheter malfunction. Bladder scan with over 900 mL of urine. Mills catheter replaced and urine draining without difficulty. Patient noted resolution of his symptoms. Appropriate for discharge with outpatient follow-up. Instructed follow-up with primary care provider and urologist, ideally to be seen within 1 week. Instructed return to emergency room for new or worsening symptoms including but not limited to abdominal pain, fevers, decreased urine output, any other symptoms he is concerned about. FINAL IMPRESSION      1.  Urinary catheter (Mills) change required          DISPOSITION/PLAN   DISPOSITION Decision To Discharge 07/08/2021 09:32:55 AM      PATIENT REFERRED TO:  Nabor Hardy MD  [address] [place] [address]  [phone]    Call in 1 day      [facility] [address] [phone]    Call in 1 day        DISCHARGE MEDICATIONS:  Discharge Medication List as of 7/8/2021  9:55 AM          DISCONTINUED MEDICATIONS:  Discharge Medication List as of 7/8/2021  9:55 AM                 (Please note that portions of this note were completed with a voice recognition program.  Efforts were made to edit the dictations but occasionally words are mis-transcribed.)    RODOLFO Leyva (electronically signed)         RODOLFO Leyva  07/08/21 [id number]

## 2021-07-20 ENCOUNTER — OFFICE VISIT (OUTPATIENT)
Dept: FAMILY MEDICINE CLINIC | Facility: CLINIC | Age: 46
End: 2021-07-20
Payer: COMMERCIAL

## 2021-07-20 VITALS
WEIGHT: 142 LBS | TEMPERATURE: 98.4 F | SYSTOLIC BLOOD PRESSURE: 114 MMHG | HEIGHT: 68 IN | BODY MASS INDEX: 21.52 KG/M2 | RESPIRATION RATE: 16 BRPM | DIASTOLIC BLOOD PRESSURE: 66 MMHG | HEART RATE: 71 BPM | OXYGEN SATURATION: 99 %

## 2021-07-20 DIAGNOSIS — K22.2 ESOPHAGEAL STRICTURE: ICD-10-CM

## 2021-07-20 DIAGNOSIS — I34.0 NONRHEUMATIC MITRAL VALVE REGURGITATION: ICD-10-CM

## 2021-07-20 DIAGNOSIS — Z00.00 ENCOUNTER FOR ANNUAL GENERAL MEDICAL EXAMINATION WITHOUT ABNORMAL FINDINGS IN ADULT: Primary | ICD-10-CM

## 2021-07-20 DIAGNOSIS — Z87.898 HISTORY OF PALPITATIONS: ICD-10-CM

## 2021-07-20 PROCEDURE — 3008F BODY MASS INDEX DOCD: CPT | Performed by: NURSE PRACTITIONER

## 2021-07-20 PROCEDURE — 99396 PREV VISIT EST AGE 40-64: CPT | Performed by: NURSE PRACTITIONER

## 2021-07-20 PROCEDURE — 93000 ELECTROCARDIOGRAM COMPLETE: CPT | Performed by: NURSE PRACTITIONER

## 2021-07-20 PROCEDURE — 3725F SCREEN DEPRESSION PERFORMED: CPT | Performed by: NURSE PRACTITIONER

## 2021-07-20 PROCEDURE — 1036F TOBACCO NON-USER: CPT | Performed by: NURSE PRACTITIONER

## 2021-07-20 NOTE — PROGRESS NOTES
FAMILY PRACTICE HEALTH MAINTENANCE OFFICE VISIT  [facility]    NAME: Betzy Dial  AGE: 39 y o  SEX: female  : 1975     DATE: 2021    Assessment and Plan     1  Encounter for annual general medical examination without abnormal findings in adult  Comments:  Age appropriate screenings and recommendations discussed  2  Nonrheumatic mitral valve regurgitation  Assessment & Plan:  Stable, no changes today  Orders:  -     POCT ECG    3  History of palpitations  -     POCT ECG    4  Esophageal stricture  -     POCT ECG      · Patient Counseling:   · Nutrition: Stressed importance of a well balanced diet, moderation of sodium/saturated fat, caloric balance and sufficient intake of fiber  · Exercise: Stressed the importance of regular exercise with a goal of 150 minutes per week  · Dental Health: Discussed daily flossing and brushing and regular dental visits   · Alcohol Use:  Recommended moderation of alcohol intake  · Injury Prevention: Discussed Safety Belts, Safety Helmets, and Smoke Detectors    · Immunizations reviewed: Up To Date  · Discussed benefits of:  Mammogram , Cervical Cancer screening and Screening labs   BMI Counseling: Body mass index is 21 59 kg/m²  Discussed with patient's BMI with her  The BMI is above normal  Nutrition recommendations include reducing portion sizes, decreasing overall calorie intake, 3-5 servings of fruits/vegetables daily, reducing fast food intake, consuming healthier snacks, decreasing soda and/or juice intake, moderation in carbohydrate intake, increasing intake of lean protein, reducing intake of saturated fat and trans fat and reducing intake of cholesterol  Exercise recommendations include moderate aerobic physical activity for 150 minutes/week and exercising 3-5 times per week  Return in about 1 year (around 2022) for Recheck          Chief Complaint     Chief Complaint   Patient presents with  Physical Exam       History of Present Illness     HPI    Well Adult Physical   Patient here for a comprehensive physical exam       Diet and Physical Activity  Diet: well balanced diet  Exercise: frequently      Depression Screen  PHQ-9 Depression Screening    PHQ-9:   Frequency of the following problems over the past two weeks:      Little interest or pleasure in doing things: 0 - not at all  Feeling down, depressed, or hopeless: 0 - not at all  PHQ-2 Score: 0          General Health  Hearing: Normal:  bilateral  Vision: goes for regular eye exams  Dental: regular dental visits    Reproductive Health  No issues       The following portions of the patient's history were reviewed and updated as appropriate: allergies, current medications, past family history, past medical history, past social history, past surgical history and problem list     Review of Systems     Review of Systems   Constitutional: Negative for diaphoresis, fatigue and fever  HENT: Negative for ear pain and hearing loss  Eyes: Negative for pain and visual disturbance  Respiratory: Negative for chest tightness and shortness of breath  Cardiovascular: Negative for chest pain, palpitations and leg swelling  Gastrointestinal: Negative for abdominal pain, constipation and diarrhea  Genitourinary: Negative for difficulty urinating  Musculoskeletal: Negative for arthralgias and myalgias  Skin: Negative for rash  Neurological: Negative for dizziness, numbness and headaches  Psychiatric/Behavioral: Negative for sleep disturbance  Past Medical History     Past Medical History:   Diagnosis Date    Anal fissure     GERD (gastroesophageal reflux disease)        Past Surgical History     History reviewed  No pertinent surgical history      Social History     Social History     Socioeconomic History    Marital status: /Civil Union     Spouse name: None    Number of children: None    Years of education: None    Highest education level: None   Occupational History    None   Tobacco Use    Smoking status: Former Smoker    Smokeless tobacco: Never Used    Tobacco comment: never a smoker as per [employer]   Vaping Use    Vaping Use: Never used   Substance and Sexual Activity    Alcohol use: Yes     Comment: socially; occasionally    Drug use: No    Sexual activity: None   Other Topics Concern    None   Social History Narrative    Dental care, regularly    Drinks coffee    Occasional caffeine consumption     Social Determinants of Health     Financial Resource Strain:     Difficulty of Paying Living Expenses:    Food Insecurity:     Worried About Running Out of Food in the Last Year:     Ran Out of Food in the Last Year:    Transportation Needs:     Lack of Transportation (Medical):      Lack of Transportation (Non-Medical):    Physical Activity:     Days of Exercise per Week:     Minutes of Exercise per Session:    Stress:     Feeling of Stress :    Social Connections:     Frequency of Communication with Friends and Family:     Frequency of Social Gatherings with Friends and Family:     Attends Judaism Services:     Active Member of Clubs or Organizations:     Attends Club or Organization Meetings:     Marital Status:    Intimate Partner Violence:     Fear of Current or Ex-Partner:     Emotionally Abused:     Physically Abused:     Sexually Abused:        Family History     Family History   Problem Relation Age of Onset    Hypertension Mother     Stroke Mother     Thyroid cancer Mother     Testicular cancer Father     Mental illness Maternal Aunt     Alzheimer's disease Paternal Grandfather     Schizophrenia Maternal Aunt     Schizophrenia Cousin     Breast cancer Family     Substance Abuse Neg Hx        Current Medications       Current Outpatient Medications:     KYLEENA 19 5 MG intrauterine device, , Disp: , Rfl:      Allergies     Allergies   Allergen Reactions    Bactrim [Sulfamethoxazole-Trimethoprim] Hives    Sulfa Antibiotics Hives       Objective     /66   Pulse 71   Temp 98 4 °F (36 9 °C) (Temporal)   Resp 16   Ht 5' 8" (1 727 m)   Wt 64 4 kg (142 lb)   SpO2 99%   BMI 21 59 kg/m²      Physical Exam  Vitals reviewed  Constitutional:       General: She is not in acute distress  Appearance: Normal appearance  She is well-developed  She is not diaphoretic  HENT:      Head: Normocephalic and atraumatic  Right Ear: Tympanic membrane, ear canal and external ear normal       Left Ear: Tympanic membrane, ear canal and external ear normal    Eyes:      General: Lids are normal       Extraocular Movements: Extraocular movements intact  Conjunctiva/sclera: Conjunctivae normal       Pupils: Pupils are equal, round, and reactive to light  Pupils are equal    Neck:      Thyroid: No thyroid mass or thyromegaly  Vascular: No carotid bruit  Cardiovascular:      Rate and Rhythm: Normal rate and regular rhythm  Pulses: Normal pulses  Heart sounds: Normal heart sounds, S1 normal and S2 normal  No murmur heard  Pulmonary:      Effort: Pulmonary effort is normal       Breath sounds: Normal breath sounds  No decreased breath sounds, wheezing, rhonchi or rales  Abdominal:      General: Bowel sounds are normal  There is no distension  Palpations: Abdomen is soft  Tenderness: There is no abdominal tenderness  Musculoskeletal:         General: Normal range of motion  Cervical back: Full passive range of motion without pain, normal range of motion and neck supple  Right lower leg: No edema  Left lower leg: No edema  Lymphadenopathy:      Cervical: No cervical adenopathy  Upper Body:      Right upper body: No supraclavicular adenopathy  Left upper body: No supraclavicular adenopathy  Skin:     General: Skin is warm and dry  Findings: No rash  Neurological:      General: No focal deficit present  Mental Status: She is alert and oriented to person, place, and time  Cranial Nerves: No cranial nerve deficit  Sensory: No sensory deficit  Motor: Motor function is intact  Coordination: Coordination normal       Deep Tendon Reflexes: Reflexes are normal and symmetric  Psychiatric:         Speech: Speech normal          Behavior: Behavior normal          Thought Content:  Thought content normal          Judgment: Judgment normal                 Jamilah Cline, [address] [place] [address],[address]

## 2022-05-26 ENCOUNTER — TELEPHONE (OUTPATIENT)
Dept: FAMILY MEDICINE CLINIC | Facility: CLINIC | Age: 47
End: 2022-05-26

## 2022-05-26 DIAGNOSIS — Z13.6 SCREENING FOR HYPERTENSION: ICD-10-CM

## 2022-05-26 DIAGNOSIS — Z00.00 ENCOUNTER FOR ANNUAL GENERAL MEDICAL EXAMINATION WITHOUT ABNORMAL FINDINGS IN ADULT: ICD-10-CM

## 2022-05-26 DIAGNOSIS — Z13.220 SCREENING FOR LIPID DISORDERS: ICD-10-CM

## 2022-05-26 DIAGNOSIS — Z11.59 ENCOUNTER FOR HEPATITIS C SCREENING TEST FOR LOW RISK PATIENT: ICD-10-CM

## 2022-05-26 DIAGNOSIS — Z00.00 ENCOUNTER FOR ANNUAL PHYSICAL EXAM: Primary | ICD-10-CM

## 2022-05-26 DIAGNOSIS — Z13.29 SCREENING FOR THYROID DISORDER: ICD-10-CM

## 2022-06-20 RX ORDER — ALPRAZOLAM 0.5 MG/1
TABLET ORAL
COMMUNITY
Start: 2022-03-16 | End: 2022-12-20 | Stop reason: ALTCHOICE

## 2022-06-21 LAB — HCV AB SER-ACNC: NON REACTIVE

## 2022-06-24 ENCOUNTER — OFFICE VISIT (OUTPATIENT)
Dept: FAMILY MEDICINE CLINIC | Facility: CLINIC | Age: 47
End: 2022-06-24
Payer: COMMERCIAL

## 2022-06-24 VITALS
DIASTOLIC BLOOD PRESSURE: 62 MMHG | SYSTOLIC BLOOD PRESSURE: 92 MMHG | HEIGHT: 68 IN | WEIGHT: 145 LBS | RESPIRATION RATE: 12 BRPM | OXYGEN SATURATION: 100 % | TEMPERATURE: 97.9 F | HEART RATE: 68 BPM | BODY MASS INDEX: 21.98 KG/M2

## 2022-06-24 DIAGNOSIS — I34.0 NONRHEUMATIC MITRAL VALVE REGURGITATION: ICD-10-CM

## 2022-06-24 DIAGNOSIS — Z00.00 ENCOUNTER FOR ANNUAL GENERAL MEDICAL EXAMINATION WITHOUT ABNORMAL FINDINGS IN ADULT: Primary | ICD-10-CM

## 2022-06-24 DIAGNOSIS — I49.3 PVC (PREMATURE VENTRICULAR CONTRACTION): ICD-10-CM

## 2022-06-24 DIAGNOSIS — R73.01 ELEVATED FASTING BLOOD SUGAR: ICD-10-CM

## 2022-06-24 DIAGNOSIS — Z87.898 HISTORY OF PALPITATIONS: ICD-10-CM

## 2022-06-24 DIAGNOSIS — Z13.6 SCREENING FOR HYPERTENSION: ICD-10-CM

## 2022-06-24 LAB — SL AMB POCT HEMOGLOBIN AIC: 5.1 (ref ?–6.5)

## 2022-06-24 PROCEDURE — 3008F BODY MASS INDEX DOCD: CPT | Performed by: NURSE PRACTITIONER

## 2022-06-24 PROCEDURE — 1036F TOBACCO NON-USER: CPT | Performed by: NURSE PRACTITIONER

## 2022-06-24 PROCEDURE — 83036 HEMOGLOBIN GLYCOSYLATED A1C: CPT | Performed by: NURSE PRACTITIONER

## 2022-06-24 PROCEDURE — 99396 PREV VISIT EST AGE 40-64: CPT | Performed by: NURSE PRACTITIONER

## 2022-06-24 PROCEDURE — 93000 ELECTROCARDIOGRAM COMPLETE: CPT | Performed by: NURSE PRACTITIONER

## 2022-06-24 NOTE — PROGRESS NOTES
FAMILY PRACTICE HEALTH MAINTENANCE OFFICE VISIT  [facility]    NAME: Hazel Ulloa  AGE: 55 y o  SEX: female  : 1975     DATE: 2022    Assessment and Plan     1  Encounter for annual general medical examination without abnormal findings in adult  Comments:  Age appropriate screenings and recommendations discussed  Fasting labs reviewed  2  Screening for hypertension  -     POCT ECG    3  Nonrheumatic mitral valve regurgitation  -     POCT ECG    4  History of palpitations  -     POCT ECG    5  Elevated fasting blood sugar  -     POCT hemoglobin A1c    6  PVC (premature ventricular contraction)  Assessment & Plan:  Encourage increased fluid intake  · Patient Counseling:   · Nutrition: Stressed importance of a well balanced diet, moderation of sodium/saturated fat, caloric balance and sufficient intake of fiber  · Exercise: Stressed the importance of regular exercise with a goal of 150 minutes per week  · Dental Health: Discussed daily flossing and brushing and regular dental visits   · Alcohol Use:  Recommended moderation of alcohol intake  · Injury Prevention: Discussed Safety Belts, Safety Helmets, and Smoke Detectors    · Immunizations reviewed: Up To Date  · Discussed benefits of:  Mammogram , Cervical Cancer screening and Screening labs   BMI Counseling: Body mass index is 22 05 kg/m²  Discussed with patient's BMI with her      Return in about 1 year (around 2023) for Annual physical         Chief Complaint     Chief Complaint   Patient presents with    Annual Exam       History of Present Illness     HPI    Well Adult Physical   Patient here for a comprehensive physical exam       Diet and Physical Activity  Diet: well balanced diet  Exercise: intermittently       General Health  Hearing: Normal:  bilateral  Vision: goes for regular eye exams  Dental: regular dental visits    Reproductive Health  No issues  and Follows with gynecologist      The following portions of the patient's history were reviewed and updated as appropriate: allergies, current medications, past family history, past medical history, past social history, past surgical history and problem list     Review of Systems     Review of Systems   Constitutional: Negative for diaphoresis, fatigue and fever  HENT: Negative for ear pain and hearing loss  Eyes: Negative for pain and visual disturbance  Respiratory: Negative for chest tightness and shortness of breath  Cardiovascular: Positive for palpitations  Negative for chest pain and leg swelling  Gastrointestinal: Negative for abdominal pain, constipation and diarrhea  Genitourinary: Negative for difficulty urinating  Musculoskeletal: Negative for arthralgias and myalgias  Skin: Negative for rash  Neurological: Negative for dizziness, numbness and headaches  Psychiatric/Behavioral: Negative for sleep disturbance  Past Medical History     Past Medical History:   Diagnosis Date    Anal fissure     GERD (gastroesophageal reflux disease)        Past Surgical History     History reviewed  No pertinent surgical history      Social History     Social History     Socioeconomic History    Marital status: /Civil Union     Spouse name: None    Number of children: None    Years of education: None    Highest education level: None   Occupational History    None   Tobacco Use    Smoking status: Former Smoker    Smokeless tobacco: Never Used    Tobacco comment: never a smoker as per [employer]   Vaping Use    Vaping Use: Never used   Substance and Sexual Activity    Alcohol use: Yes     Comment: socially; occasionally    Drug use: No    Sexual activity: None   Other Topics Concern    None   Social History Narrative    Dental care, regularly    Drinks coffee    Occasional caffeine consumption     Social Determinants of Health     Financial Resource Strain: Not on file   Food Insecurity: Not on file   Transportation Needs: Not on file   Physical Activity: Not on file   Stress: Not on file   Social Connections: Not on file   Intimate Partner Violence: Not on file   Housing Stability: Not on file       Family History     Family History   Problem Relation Age of Onset    Hypertension Mother     Stroke Mother     Thyroid cancer Mother     Testicular cancer Father     Mental illness Maternal Aunt     Alzheimer's disease Paternal Grandfather     Schizophrenia Maternal Aunt     Schizophrenia Cousin     Breast cancer Family     Substance Abuse Neg Hx        Current Medications       Current Outpatient Medications:     KYLEENA 19 5 MG intrauterine device, , Disp: , Rfl:     ALPRAZolam (XANAX) 0 5 mg tablet, , Disp: , Rfl:      Allergies     Allergies   Allergen Reactions    Bactrim [Sulfamethoxazole-Trimethoprim] Hives    Sulfa Antibiotics Hives       Objective     BP 92/62   Pulse 68   Temp 97 9 °F (36 6 °C) (Temporal)   Resp 12   Ht 5' 8" (1 727 m)   Wt 65 8 kg (145 lb)   SpO2 100%   BMI 22 05 kg/m²      Physical Exam  Vitals reviewed  Constitutional:       General: She is not in acute distress  Appearance: Normal appearance  She is well-developed  She is not diaphoretic  HENT:      Head: Normocephalic and atraumatic  Right Ear: Tympanic membrane, ear canal and external ear normal       Left Ear: Tympanic membrane, ear canal and external ear normal    Eyes:      General: Lids are normal       Extraocular Movements: Extraocular movements intact  Conjunctiva/sclera: Conjunctivae normal       Pupils: Pupils are equal, round, and reactive to light  Funduscopic exam:     Right eye: No hemorrhage or exudate  Red reflex present  Left eye: No hemorrhage or exudate  Red reflex present  Neck:      Thyroid: No thyroid mass or thyromegaly  Vascular: No carotid bruit  Cardiovascular:      Rate and Rhythm: Normal rate and regular rhythm  Pulses: Normal pulses  Heart sounds: Normal heart sounds, S1 normal and S2 normal  No murmur heard  Pulmonary:      Effort: Pulmonary effort is normal       Breath sounds: Normal breath sounds  No decreased breath sounds, wheezing, rhonchi or rales  Chest:   Breasts:      Right: No supraclavicular adenopathy  Left: No supraclavicular adenopathy  Abdominal:      General: Bowel sounds are normal  There is no distension  Palpations: Abdomen is soft  Tenderness: There is no abdominal tenderness  Musculoskeletal:         General: Normal range of motion  Cervical back: Full passive range of motion without pain, normal range of motion and neck supple  Right lower leg: No edema  Left lower leg: No edema  Lymphadenopathy:      Cervical: No cervical adenopathy  Upper Body:      Right upper body: No supraclavicular adenopathy  Left upper body: No supraclavicular adenopathy  Skin:     General: Skin is warm and dry  Findings: No rash  Neurological:      Mental Status: She is alert and oriented to person, place, and time  Cranial Nerves: No cranial nerve deficit  Sensory: No sensory deficit  Coordination: Coordination normal       Deep Tendon Reflexes: Reflexes are normal and symmetric  Psychiatric:         Speech: Speech normal          Behavior: Behavior normal          Thought Content:  Thought content normal          Judgment: Judgment normal                Kurtis Jacobo, [address] [facility],[address]

## 2022-12-19 LAB — SARS-COV-2 AG UPPER RESP QL IA: ABNORMAL

## 2022-12-20 ENCOUNTER — TELEMEDICINE (OUTPATIENT)
Dept: FAMILY MEDICINE CLINIC | Facility: CLINIC | Age: 47
End: 2022-12-20

## 2022-12-20 ENCOUNTER — PATIENT MESSAGE (OUTPATIENT)
Dept: FAMILY MEDICINE CLINIC | Facility: CLINIC | Age: 47
End: 2022-12-20

## 2022-12-20 VITALS — BODY MASS INDEX: 21.98 KG/M2 | HEIGHT: 68 IN | WEIGHT: 145 LBS

## 2022-12-20 DIAGNOSIS — U07.1 COVID-19: Primary | ICD-10-CM

## 2022-12-20 RX ORDER — METRONIDAZOLE 7.5 MG/G
GEL VAGINAL
COMMUNITY
Start: 2022-10-24 | End: 2022-12-20 | Stop reason: ALTCHOICE

## 2022-12-20 RX ORDER — MULTIVIT-MIN/IRON FUM/FOLIC AC 7.5 MG-4
1 TABLET ORAL DAILY
COMMUNITY

## 2022-12-20 RX ORDER — FLUCONAZOLE 200 MG/1
TABLET ORAL
COMMUNITY
Start: 2022-11-01 | End: 2022-12-20 | Stop reason: ALTCHOICE

## 2022-12-20 RX ORDER — NIRMATRELVIR AND RITONAVIR 300-100 MG
3 KIT ORAL 2 TIMES DAILY
Qty: 30 TABLET | Refills: 0 | Status: SHIPPED | OUTPATIENT
Start: 2022-12-20 | End: 2022-12-25

## 2022-12-20 RX ORDER — CLOTRIMAZOLE AND BETAMETHASONE DIPROPIONATE 10; .64 MG/G; MG/G
CREAM TOPICAL
COMMUNITY
Start: 2022-10-24 | End: 2022-12-20 | Stop reason: ALTCHOICE

## 2022-12-20 NOTE — TELEPHONE ENCOUNTER
From: Hood Jackson  To: Mendez Gonzalez  Sent: 12/20/2022 10:51 AM EST  Subject: Test result    Please see attached covid test result for my zoom appointment today with Dr Janessa Willett

## 2022-12-20 NOTE — PATIENT INSTRUCTIONS
PLENTY FLUIDS  REST  MUCINEX  MEDICATION AS DIRECTED  IF SYMPTOMS PERSIST OR WORSEN, PLEASE CALL      COVID-19 Home Care Guidelines    Your healthcare provider and/or public health staff have evaluated you and have determined that you do not need to remain in the hospital at this time  At this time you can be isolated at home where you will be monitored by staff from your local or state health department  You should carefully follow the prevention and isolation steps below until a healthcare provider or local or state health department says that you can return to your normal activities  Stay home except to get medical care    People who are mildly ill with COVID-19 are able to isolate at home during their illness  You should restrict activities outside your home, except for getting medical care  Do not go to work, school, or public areas  Avoid using public transportation, ride-sharing, or taxis  Separate yourself from other people and animals in your home    People: As much as possible, you should stay in a specific room and away from other people in your home  Also, you should use a separate bathroom, if available  Animals: You should restrict contact with pets and other animals while you are sick with COVID-19, just like you would around other people  Although there have not been reports of pets or other animals becoming sick with COVID-19, it is still recommended that people sick with COVID-19 limit contact with animals until more information is known about the virus  When possible, have another member of your household care for your animals while you are sick  If you are sick with COVID-19, avoid contact with your pet, including petting, snuggling, being kissed or licked, and sharing food  If you must care for your pet or be around animals while you are sick, wash your hands before and after you interact with pets and wear a facemask  See COVID-19 and Animals for more information      Call ahead before visiting your doctor    If you have a medical appointment, call the healthcare provider and tell them that you have or may have COVID-19  This will help the healthcare provider’s office take steps to keep other people from getting infected or exposed  Wear a facemask    You should wear a facemask when you are around other people (e g , sharing a room or vehicle) or pets and before you enter a healthcare provider’s office  If you are not able to wear a facemask (for example, because it causes trouble breathing), then people who live with you should not stay in the same room with you, or they should wear a facemask if they enter your room  Cover your coughs and sneezes    Cover your mouth and nose with a tissue when you cough or sneeze  Throw used tissues in a lined trash can  Immediately wash your hands with soap and water for at least 20 seconds or, if soap and water are not available, clean your hands with an alcohol-based hand  that contains at least 60% alcohol  Clean your hands often    Wash your hands often with soap and water for at least 20 seconds, especially after blowing your nose, coughing, or sneezing; going to the bathroom; and before eating or preparing food  If soap and water are not readily available, use an alcohol-based hand  with at least 60% alcohol, covering all surfaces of your hands and rubbing them together until they feel dry  Soap and water are the best option if hands are visibly dirty  Avoid touching your eyes, nose, and mouth with unwashed hands  Avoid sharing personal household items    You should not share dishes, drinking glasses, cups, eating utensils, towels, or bedding with other people or pets in your home  After using these items, they should be washed thoroughly with soap and water      Clean all “high-touch” surfaces everyday    High touch surfaces include counters, tabletops, doorknobs, bathroom fixtures, toilets, phones, keyboards, tablets, and bedside tables  Also, clean any surfaces that may have blood, stool, or body fluids on them  Use a household cleaning spray or wipe, according to the label instructions  Labels contain instructions for safe and effective use of the cleaning product including precautions you should take when applying the product, such as wearing gloves and making sure you have good ventilation during use of the product  Monitor your symptoms    Seek prompt medical attention if your illness is worsening (e g , difficulty breathing)  Before seeking care, call your healthcare provider and tell them that you have, or are being evaluated for, COVID-19  Put on a facemask before you enter the facility  These steps will help the healthcare provider’s office to keep other people in the office or waiting room from getting infected or exposed  Ask your healthcare provider to call the local or [facility] health department  Persons who are placed under active monitoring or facilitated self-monitoring should follow instructions provided by their local health department or occupational health professionals, as appropriate  If you have a medical emergency and need to call 911, notify the dispatch personnel that you have, or are being evaluated for COVID-19  If possible, put on a facemask before emergency medical services arrive  Discontinuing home isolation    Patients with confirmed COVID-19 should remain under home isolation precautions until the following conditions are met:    They have had no fever for at least 24 hours (that is one full day of no fever without the use medicine that reduces fevers)  AND  other symptoms have improved (for example, when their cough or shortness of breath have improved)  AND  If had mild or moderate illness, at least 10 days have passed since their symptoms first appeared or if severe illness (needed oxygen) or immunosuppressed, at least 20 days have passed since symptoms first appeared  Patients with confirmed COVID-19 should also notify close contacts (including their workplace) and ask that they self-quarantine  Currently, close contact is defined as being within 6 feet for 15 minutes or more from the period 24 hours starting 48 hours before symptom onset to the time at which the patient went into isolation  Close contacts of patients diagnosed with COVID-19 should be instructed by the patient to self-quarantine for 14 days from the last time of their last contact with the patient       Source: [employer]

## 2022-12-20 NOTE — PROGRESS NOTES
COVID-19 Outpatient Progress Note    Assessment/Plan:    Problem List Items Addressed This Visit    None  Visit Diagnoses     COVID-19    -  Primary    Relevant Medications    nirmatrelvir & ritonavir (Paxlovid, 300/100,) tablet therapy pack         Disposition:     Discussed symptom directed medication options with patient  Discussed vitamin D, vitamin C, and/or zinc supplementation with patient  Patient meets criteria for PAXLOVID and they have been counseled appropriately according to EUA documentation released by the FDA  After discussion, patient agrees to treatment  Hernánotha Nurys is an investigational medicine used to treat mild-to-moderate COVID-19 in adults and children (15years of age and older weighing at least 80 pounds (40 kg)) with positive results of direct SARS-CoV-2 viral testing, and who are at high risk for progression to severe COVID-19, including hospitalization or death  PAXLOVID is investigational because it is still being studied  There is limited information about the safety and effectiveness of using PAXLOVID to treat people with mild-to-moderate COVID-19  The FDA has authorized the emergency use of PAXLOVID for the treatment of mild-tomoderate COVID-19 in adults and children (15years of age and older weighing at least 80 pounds (40 kg)) with a positive test for the virus that causes COVID-19, and who are at high risk for progression to severe COVID-19, including hospitalization or death, under an EUA  What should I tell my healthcare provider before I take PAXLOVID? Tell your healthcare provider if you:  - Have any allergies  - Have liver or kidney disease  - Are pregnant or plan to become pregnant  - Are breastfeeding a child  - Have any serious illnesses    Tell your healthcare provider about all the medicines you take, including prescription and over-the-counter medicines, vitamins, and herbal supplements   Some medicines may interact with PAXLOVID and may cause serious side effects  Keep a list of your medicines to show your healthcare provider and pharmacist when you get a new medicine  You can ask your healthcare provider or pharmacist for a list of medicines that interact with PAXLOVID  Do not start taking a new medicine without telling your healthcare provider  Your healthcare provider can tell you if it is safe to take PAXLOVID with other medicines  Tell your healthcare provider if you are taking combined hormonal contraceptive  PAXLOVID may affect how your birth control pills work  Females who are able to become pregnant should use another effective alternative form of contraception or an additional barrier method of contraception  Talk to your healthcare provider if you have any questions about contraceptive methods that might be right for you  How do I take PAXLOVID? PAXLOVID consists of 2 medicines: nirmatrelvir and ritonavir  - Take 2 pink tablets of nirmatrelvir with 1 white tablet of ritonavir by mouth 2 times each day (in the morning and in the evening) for 5 days  For each dose, take all 3 tablets at the same time  - If you have kidney disease, talk to your healthcare provider  You may need a different dose  - Swallow the tablets whole  Do not chew, break, or crush the tablets  - Take PAXLOVID with or without food  - Do not stop taking PAXLOVID without talking to your healthcare provider, even if you feel better  - If you miss a dose of PAXLOVID within 8 hours of the time it is usually taken, take it as soon as you remember  If you miss a dose by more than 8 hours, skip the missed dose and take the next dose at your regular time  Do not take 2 doses of PAXLOVID at the same time  - If you take too much PAXLOVID, call your healthcare provider or go to the nearest hospital emergency room right away    - If you are taking a ritonavir- or cobicistat-containing medicine to treat hepatitis C or Human Immunodeficiency Virus (HIV), you should continue to take your medicine as prescribed by your healthcare provider   - Talk to your healthcare provider if you do not feel better or if you feel worse after 5 days  Who should generally not take PAXLOVID? Do not take PAXLOVID if:  You are allergic to nirmatrelvir, ritonavir, or any of the ingredients in PAXLOVID  You are taking any of the following medicines:  - Alfuzosin  - Pethidine, piroxicam, propoxyphene  - Ranolazine  - Amiodarone, dronedarone, flecainide, propafenone, quinidine  - Colchicine  - Lurasidone, pimozide, clozapine  - Dihydroergotamine, ergotamine, methylergonovine  - Lovastatin, simvastatin  - Sildenafil (Revatio®) for pulmonary arterial hypertension (PAH)  - Triazolam, oral midazolam  - Apalutamide  - Carbamazepine, phenobarbital, phenytoin  - Rifampin  - St  Shahab’s Wort (hypericum perforatum)    What are the important possible side effects of PAXLOVID? Possible side effects of PAXLOVID are:  - Liver Problems  Tell your healthcare provider right away if you have any of these signs and symptoms of liver problems: loss of appetite, yellowing of your skin and the whites of eyes (jaundice), dark-colored urine, pale colored stools and itchy skin, stomach area (abdominal) pain  - Resistance to HIV Medicines  If you have untreated HIV infection, PAXLOVID may lead to some HIV medicines not working as well in the future  - Other possible side effects include: altered sense of taste, diarrhea, high blood pressure, or muscle aches    These are not all the possible side effects of PAXLOVID  Not many people have taken PAXLOVID  Serious and unexpected side effects may happen  Leilani Villanueva is still being studied, so it is possible that all of the risks are not known at this time  What other treatment choices are there? Like Elvira Shadow may allow for the emergency use of other medicines to treat people with COVID-19   Go to https://POI/ for information on the emergency use of other medicines that are authorized by FDA to treat people with COVID-19  Your healthcare provider may talk with you about clinical trials for which you may be eligible  It is your choice to be treated or not to be treated with PAXLOVID  Should you decide not to receive it or for your child not to receive it, it will not change your standard medical care  What if I am pregnant or breastfeeding? There is no experience treating pregnant women or breastfeeding mothers with PAXLOVID  For a mother and unborn baby, the benefit of taking PAXLOVID may be greater than the risk from the treatment  If you are pregnant, discuss your options and specific situation with your healthcare provider  It is recommended that you use effective barrier contraception or do not have sexual activity while taking PAXLOVID  If you are breastfeeding, discuss your options and specific situation with your healthcare provider  How do I report side effects with PAXLOVID? Contact your healthcare provider if you have any side effects that bother you or do not go away  Report side effects to FDA MedWatch at www fda gov/medwatch or call 6-402-KFZ2659 or you can report side effects to [facility]  at the contact information provided below  Website Fax number Telephone number   [employer] [phone]     How should I store Johnsno Clark? Store PAXLOVID tablets at room temperature between 68°F to 77°F (20°C to 25°C)  Full fact sheet for patients, parents, and caregivers can be found at: [facility] [employer]    I have spent 15 minutes directly with the patient  Greater than 50% of this time was spent in counseling/coordination of care regarding: instructions for management and impressions         Encounter provider: Christopher Ulloa MD     Provider located at: [address] [place] [address] Street [id number]     Recent Visits  No visits were found meeting these conditions  Showing recent visits within past 7 days and meeting all other requirements  Today's Visits  Date Type Provider Dept   12/20/22 Telemedicine Christopher Ulloa MD [facility]   Showing today's visits and meeting all other requirements  Future Appointments  No visits were found meeting these conditions  Showing future appointments within next 150 days and meeting all other requirements     This virtual check-in was done via [employer] [address] and patient was informed that this is a secure, HIPAA-compliant platform  She agrees to proceed  Patient agrees to participate in a virtual check in via telephone or video visit instead of presenting to the office to address urgent/immediate medical needs  Patient is aware this is a billable service  She acknowledged consent and understanding of privacy and security of the video platform  The patient has agreed to participate and understands they can discontinue the visit at any time  After connecting through [facility], the patient was identified by name and date of birth  Jame Gee was informed that this was a telemedicine visit and that the exam was being conducted confidentially over secure lines  My office door was closed  No one else was in the room  Jame Gee acknowledged consent and understanding of privacy and security of the telemedicine visit  I informed the patient that I have reviewed her record in Epic and presented the opportunity for her to ask any questions regarding the visit today  The patient agreed to participate      Verification of patient location:  Patient is located in the following state in which I hold an active license: [place]    Subjective:   Jame Gee is a 52 y o  female who is concerned about COVID-19  Patient's symptoms include fever, fatigue, nasal congestion, sore throat, cough and headache  Patient denies chills, malaise, rhinorrhea, anosmia, loss of taste, shortness of breath, chest tightness, abdominal pain, nausea, vomiting, diarrhea and myalgias  - Date of symptom onset: 12/17/2022      COVID-19 vaccination status: Fully vaccinated (primary series)    Exposure:   Contact with a person who is under investigation (PUI) for or who is positive for COVID-19 within the last 14 days?: No    Hospitalized recently for fever and/or lower respiratory symptoms?: No      Currently a healthcare worker that is involved in direct patient care?: No      Works in a special setting where the risk of COVID-19 transmission may be high? (this may include long-term care, correctional and group home facilities; homeless shelters; assisted-living facilities and group homes ): No      Resident in a special setting where the risk of COVID-19 transmission may be high? (this may include long-term care, correctional and group home facilities; homeless shelters; assisted-living facilities and group homes ): No      Lab Results   Component Value Date    [facility] (Patient Reported) (A) 12/19/2022       Review of Systems   Constitutional: Positive for fatigue and fever  Negative for chills  HENT: Positive for congestion and sore throat  Negative for rhinorrhea  Eyes: Negative for discharge  Respiratory: Positive for cough  Negative for chest tightness and shortness of breath  Cardiovascular: Negative for chest pain and palpitations  Gastrointestinal: Negative for abdominal pain, diarrhea, nausea and vomiting  Musculoskeletal: Negative for arthralgias, joint swelling and myalgias  Neurological: Positive for headaches  Negative for numbness  Psychiatric/Behavioral: The patient is not nervous/anxious        Current Outpatient Medications on File Prior to Visit   Medication Sig   • ELDERBERRY PO Take by mouth in the morning   • KYLEENA 19 5 MG intrauterine device    • Multiple Vitamins-Minerals (multivitamin with minerals) tablet Take 1 tablet by mouth daily   • Multiple Vitamins-Minerals (ZINC PO) Take by mouth in the morning   • [DISCONTINUED] ALPRAZolam (XANAX) 0 5 mg tablet  (Patient not taking: Reported on 6/24/2022)   • [DISCONTINUED] clotrimazole-betamethasone (LOTRISONE) 1-0 05 % cream APPLY BY TOPICAL ROUTE 2 TIMES EVERY DAY FOR 2 WEEKS TO THE AFFECTED AND SURROUNDING AREAS OF SKIN IN THE MORNING AND EVENING (Patient not taking: Reported on 12/20/2022)   • [DISCONTINUED] fluconazole (DIFLUCAN) 200 mg tablet TAKE 1 TABLET BY ORAL ROUTE EVERY OTHER DAY (Patient not taking: Reported on 12/20/2022)   • [DISCONTINUED] metroNIDAZOLE (METROGEL) 0 75 % vaginal gel INSERT 1 APPLICATORFUL BY VAGINAL ROUTE EVERY DAY AT BEDTIME (Patient not taking: Reported on 12/20/2022)       Objective:    Ht 5' 8" (1 727 m)   Wt 65 8 kg (145 lb)   LMP 12/13/2022 (Approximate)   BMI 22 05 kg/m²      Physical Exam     PATIENT VISUALLY APPEARS  IN NO OBVIOUS DISTRESS    Aryan Harper MD

## 2023-02-28 ENCOUNTER — OFFICE VISIT (OUTPATIENT)
Dept: FAMILY MEDICINE CLINIC | Facility: CLINIC | Age: 48
End: 2023-02-28

## 2023-02-28 VITALS
HEIGHT: 68 IN | WEIGHT: 149 LBS | OXYGEN SATURATION: 100 % | HEART RATE: 66 BPM | TEMPERATURE: 98.7 F | BODY MASS INDEX: 22.58 KG/M2 | RESPIRATION RATE: 12 BRPM | SYSTOLIC BLOOD PRESSURE: 100 MMHG | DIASTOLIC BLOOD PRESSURE: 78 MMHG

## 2023-02-28 DIAGNOSIS — J02.9 PHARYNGITIS, UNSPECIFIED ETIOLOGY: Primary | ICD-10-CM

## 2023-02-28 LAB — S PYO AG THROAT QL: NEGATIVE

## 2023-02-28 RX ORDER — AZITHROMYCIN 250 MG/1
TABLET, FILM COATED ORAL
Qty: 6 TABLET | Refills: 0 | Status: SHIPPED | OUTPATIENT
Start: 2023-02-28 | End: 2023-03-04

## 2023-02-28 RX ORDER — FLUCONAZOLE 150 MG/1
TABLET ORAL
Qty: 2 TABLET | Refills: 3 | Status: SHIPPED | OUTPATIENT
Start: 2023-02-28 | End: 2023-03-03

## 2023-02-28 NOTE — PROGRESS NOTES
Name: Merrai Georges      : 1975      MRN: [id number]  Encounter Provider: Nicole De Los Santos MD  Encounter Date: 2023   Encounter department: [address] [place] [address]  Pharyngitis, unspecified etiology  -     POCT rapid strepA  -     azithromycin (ZITHROMAX) 250 mg tablet; Take 2 tablets today then 1 tablet daily x 4 days  -     fluconazole (DIFLUCAN) 150 mg tablet; 1 TAB PO TODAY, 1 TAB PO 3 DAYS LATER      Depression Screening and Follow-up Plan: Patient was screened for depression during today's encounter  They screened negative with a PHQ-2 score of 2  Subjective      Sore Throat   This is a new problem  The current episode started in the past 7 days  The problem has been unchanged  There has been no fever  The pain is moderate  Associated symptoms include congestion, a plugged ear sensation, neck pain and swollen glands  Pertinent negatives include no abdominal pain, coughing, diarrhea, drooling, ear pain, headaches, hoarse voice, shortness of breath, stridor, trouble swallowing or vomiting  She has had no exposure to strep or mono  She has tried NSAIDs for the symptoms  The treatment provided mild relief  Review of Systems   Constitutional: Negative for fever  HENT: Positive for congestion and sore throat  Negative for drooling, ear pain, hoarse voice and trouble swallowing  Eyes: Negative for discharge  Respiratory: Negative for cough, chest tightness, shortness of breath and stridor  Cardiovascular: Negative for chest pain and palpitations  Gastrointestinal: Negative for abdominal pain, diarrhea, nausea and vomiting  Musculoskeletal: Positive for neck pain  Negative for arthralgias and joint swelling  Neurological: Negative for numbness and headaches  Psychiatric/Behavioral: The patient is not nervous/anxious          Current Outpatient Medications on File Prior to Visit   Medication Sig   • ELDERBERRY PO Take by mouth in the morning   • KYLEENA 19 5 MG intrauterine device    • Multiple Vitamins-Minerals (multivitamin with minerals) tablet Take 1 tablet by mouth daily   • Multiple Vitamins-Minerals (ZINC PO) Take by mouth in the morning       Objective     /78   Pulse 66   Temp 98 7 °F (37 1 °C) (Temporal)   Resp 12   Ht 5' 8" (1 727 m)   Wt 67 6 kg (149 lb)   LMP 02/15/2023 (Approximate)   SpO2 100%   BMI 22 66 kg/m²     Physical Exam  Constitutional:       Appearance: She is well-developed  HENT:      Head: Normocephalic and atraumatic  Right Ear: Ear canal and external ear normal  No middle ear effusion  Left Ear: Ear canal and external ear normal   No middle ear effusion  Nose: Mucosal edema and rhinorrhea present  Mouth/Throat:      Pharynx: Uvula midline  Posterior oropharyngeal erythema present  No oropharyngeal exudate  Eyes:      General:         Right eye: No discharge  Left eye: No discharge  Pupils: Pupils are equal, round, and reactive to light  Cardiovascular:      Rate and Rhythm: Normal rate and regular rhythm  Heart sounds: Normal heart sounds  No murmur heard  Pulmonary:      Effort: Pulmonary effort is normal  No respiratory distress  Breath sounds: No wheezing or rales  Abdominal:      General: Bowel sounds are normal       Palpations: Abdomen is soft  Tenderness: There is no abdominal tenderness  There is no rebound  Musculoskeletal:      Cervical back: Normal range of motion and neck supple  Lymphadenopathy:      Cervical: Cervical adenopathy present  Skin:     General: Skin is warm and dry  Findings: No rash  Neurological:      Mental Status: She is alert and oriented to person, place, and time  Psychiatric:         Behavior: Behavior normal          Thought Content:  Thought content normal          Judgment: Judgment normal        Oralee MD Ad

## 2023-05-23 NOTE — TELEPHONE ENCOUNTER
Marimar Palomino is going to [facility]  [place]  She needs to have an order placed  She got something in her eye over the weekend  They are going to submit it through medical insurance 
ORDERED
Received and faxed to [phone] along with the insurance referral
[FreeTextEntry1] : \par In regards to hypertension\par Patient's blood pressure in adequate range. \par DIscussed avoid using salt, monitoring his blood pressures at home and bring the log on next visit.\par Continue Amlodipine 5 mg QD\par Continue Bisoprolol 5 mg QD\par Check a CMP\par \par In regards to hyperlipidemia:\par Lifestyle modifications discussed\par Checking lipid panel\par Continue simvastatin 40 mg QHS\par \par Ordering an iFOBT\par \par Return to care: within 1 month for AWE or sooner should the need arise\par Call or return for any questions

## 2023-07-27 DIAGNOSIS — Z13.6 SCREENING FOR HYPERTENSION: ICD-10-CM

## 2023-07-27 DIAGNOSIS — Z00.00 ENCOUNTER FOR ANNUAL GENERAL MEDICAL EXAMINATION WITHOUT ABNORMAL FINDINGS IN ADULT: ICD-10-CM

## 2023-07-27 DIAGNOSIS — K21.9 GERD WITHOUT ESOPHAGITIS: Primary | ICD-10-CM

## 2023-07-27 DIAGNOSIS — Z13.220 SCREENING FOR LIPID DISORDERS: ICD-10-CM

## 2023-07-27 DIAGNOSIS — Z13.29 SCREENING FOR THYROID DISORDER: ICD-10-CM

## 2023-09-20 ENCOUNTER — OFFICE VISIT (OUTPATIENT)
Dept: FAMILY MEDICINE CLINIC | Facility: CLINIC | Age: 48
End: 2023-09-20
Payer: COMMERCIAL

## 2023-09-20 VITALS
OXYGEN SATURATION: 99 % | DIASTOLIC BLOOD PRESSURE: 68 MMHG | BODY MASS INDEX: 22.58 KG/M2 | HEART RATE: 83 BPM | RESPIRATION RATE: 12 BRPM | TEMPERATURE: 94.8 F | HEIGHT: 68 IN | WEIGHT: 149 LBS | SYSTOLIC BLOOD PRESSURE: 100 MMHG

## 2023-09-20 DIAGNOSIS — K22.2 ESOPHAGEAL STRICTURE: ICD-10-CM

## 2023-09-20 DIAGNOSIS — Z13.6 SCREENING FOR HYPERTENSION: ICD-10-CM

## 2023-09-20 DIAGNOSIS — K21.9 GERD WITHOUT ESOPHAGITIS: ICD-10-CM

## 2023-09-20 DIAGNOSIS — I34.0 NONRHEUMATIC MITRAL VALVE REGURGITATION: ICD-10-CM

## 2023-09-20 DIAGNOSIS — Z00.00 ROUTINE GENERAL MEDICAL EXAMINATION AT A HEALTH CARE FACILITY: Primary | ICD-10-CM

## 2023-09-20 DIAGNOSIS — Z13.220 SCREENING FOR HYPERLIPIDEMIA: ICD-10-CM

## 2023-09-20 DIAGNOSIS — Z12.31 ENCOUNTER FOR SCREENING MAMMOGRAM FOR BREAST CANCER: ICD-10-CM

## 2023-09-20 DIAGNOSIS — Z13.29 SCREENING FOR HYPOTHYROIDISM: ICD-10-CM

## 2023-09-20 DIAGNOSIS — Z12.11 COLON CANCER SCREENING: ICD-10-CM

## 2023-09-20 PROCEDURE — 93000 ELECTROCARDIOGRAM COMPLETE: CPT | Performed by: FAMILY MEDICINE

## 2023-09-20 PROCEDURE — 99396 PREV VISIT EST AGE 40-64: CPT | Performed by: FAMILY MEDICINE

## 2023-09-20 NOTE — PATIENT INSTRUCTIONS
DISCUSSED HEALTH ISSUES  HEALTHY DIET AND EXERCISE  BW WILL BE REVIEWED  MAMMOGRAPHY   RECOMMEND CALCIUM [id number] MG DAILY  VITAMIN D3  1000 IU DAILY  RV IN 1 YEAR FOR ANNUAL EXAM, SOONER IF NEEDED

## 2023-09-20 NOTE — PROGRESS NOTES
Depression Screening and Follow-up Plan: Patient was screened for depression during today's encounter. They screened negative with a PHQ-2 score of 0.    FAMILY PRACTICE HEALTH MAINTENANCE OFFICE VISIT  [facility] - [address] [facility]    NAME: Sandra Laughlin  AGE: 50 y.o. SEX: female  : 1975     DATE: 2023    Assessment and Plan     Problem List Items Addressed This Visit        Digestive    Esophageal stricture    GERD without esophagitis       Cardiovascular and Mediastinum    Nonrheumatic mitral valve regurgitation   Other Visit Diagnoses     Routine general medical examination at a health care facility    -  Primary    Screening for hypertension        Relevant Orders    POCT ECG (Completed)    Screening for hyperlipidemia        Screening for hypothyroidism        Encounter for screening mammogram for breast cancer        Relevant Orders    Mammo screening bilateral w 3d & cad    Colon cancer screening        Relevant Orders    Cologuard               No follow-ups on file.         Chief Complaint     Chief Complaint   Patient presents with   • Annual Exam       History of Present Illness     [address] RECORD  NO CONCERNS AT THIS TIME        Well Adult Physical   Patient here for a comprehensive physical exam.      Diet and Physical Activity  Diet: well balanced diet  Weight concerns: None, patient's BMI is between 18.5-24.9  Exercise: frequently      Depression Screen  PHQ-2/9 Depression Screening    Little interest or pleasure in doing things: 0 - not at all  Feeling down, depressed, or hopeless: 0 - not at all  PHQ-2 Score: 0  PHQ-2 Interpretation: Negative depression screen          General Health  Hearing: Normal:  bilateral  Vision: no vision problems  Dental: regular dental visits    Reproductive Health          The following portions of the patient's history were reviewed and updated as appropriate: allergies, current medications, past family history, past medical history, past social history, past surgical history and problem list.    Review of Systems     Review of Systems   Constitutional: Negative for chills, fatigue and fever. HENT: Negative for congestion, ear discharge, ear pain, mouth sores, postnasal drip, sore throat and trouble swallowing. Eyes: Negative for pain, discharge and visual disturbance. Respiratory: Negative for cough, shortness of breath and wheezing. Cardiovascular: Negative for chest pain, palpitations and leg swelling. Gastrointestinal: Negative for abdominal distention, abdominal pain, blood in stool, diarrhea and nausea. Endocrine: Negative for polydipsia, polyphagia and polyuria. Genitourinary: Negative for dysuria, frequency, hematuria and urgency. Musculoskeletal: Negative for arthralgias, gait problem and joint swelling. Skin: Negative for pallor and rash. Neurological: Negative for dizziness, syncope, speech difficulty, weakness, light-headedness, numbness and headaches. Hematological: Negative for adenopathy. Psychiatric/Behavioral: Negative for behavioral problems, confusion and sleep disturbance. The patient is not nervous/anxious. Past Medical History     Past Medical History:   Diagnosis Date   • Anal fissure    • GERD (gastroesophageal reflux disease)        Past Surgical History     No past surgical history on file.     Social History     Social History     Socioeconomic History   • Marital status: /Civil Union     Spouse name: None   • Number of children: None   • Years of education: None   • Highest education level: None   Occupational History   • None   Tobacco Use   • Smoking status: Former   • Smokeless tobacco: Never   • Tobacco comments:     never a smoker as per [employer]   Vaping Use   • Vaping Use: Never used   Substance and Sexual Activity   • Alcohol use: Yes     Comment: socially; occasionally   • Drug use: No   • Sexual activity: None Other Topics Concern   • None   Social History Narrative    Dental care, regularly    Drinks coffee    Occasional caffeine consumption     Social Determinants of Health     Financial Resource Strain: Not on file   Food Insecurity: Not on file   Transportation Needs: Not on file   Physical Activity: Not on file   Stress: Not on file   Social Connections: Not on file   Intimate Partner Violence: Not on file   Housing Stability: Not on file       Family History     Family History   Problem Relation Age of Onset   • Hypertension Mother    • Stroke Mother    • Thyroid cancer Mother    • Testicular cancer Father    • Mental illness Maternal Aunt    • Alzheimer's disease Paternal Grandfather    • Schizophrenia Maternal Aunt    • Schizophrenia Cousin    • Breast cancer Family    • Substance Abuse Neg Hx        Current Medications       Current Outpatient Medications:   •  KYLEENA 19.5 MG intrauterine device, , Disp: , Rfl:   •  Multiple Vitamins-Minerals (multivitamin with minerals) tablet, Take 1 tablet by mouth daily, Disp: , Rfl:   •  Multiple Vitamins-Minerals (ZINC PO), Take by mouth in the morning, Disp: , Rfl:      Allergies     Allergies   Allergen Reactions   • Bactrim [Sulfamethoxazole-Trimethoprim] Hives   • Sulfa Antibiotics Hives       Objective     /68 (BP Location: Left arm, Patient Position: Sitting, Cuff Size: Large)   Pulse 83   Temp (!) 94.8 °F (34.9 °C) (Temporal)   Resp 12   Ht 5' 8" (1.727 m)   Wt 67.6 kg (149 lb)   LMP 09/12/2023 (Approximate)   SpO2 99%   BMI 22.66 kg/m²      Physical Exam  Vitals reviewed. Constitutional:       General: She is not in acute distress. Appearance: Normal appearance. She is well-developed and normal weight. She is not ill-appearing. HENT:      Head: Normocephalic and atraumatic.       Right Ear: External ear normal.      Left Ear: External ear normal.      Nose: Nose normal.      Mouth/Throat:      Mouth: Mucous membranes are moist.   Eyes: General:         Right eye: No discharge. Left eye: No discharge. Conjunctiva/sclera: Conjunctivae normal.      Pupils: Pupils are equal, round, and reactive to light. Neck:      Thyroid: No thyromegaly. Cardiovascular:      Rate and Rhythm: Normal rate and regular rhythm. Heart sounds: Normal heart sounds. No murmur heard. Pulmonary:      Effort: Pulmonary effort is normal.      Breath sounds: Normal breath sounds. No wheezing or rales. Abdominal:      General: Bowel sounds are normal. There is no distension. Palpations: Abdomen is soft. There is no mass. Tenderness: There is no abdominal tenderness. There is no guarding or rebound. Musculoskeletal:         General: No tenderness or deformity. Normal range of motion. Cervical back: Normal range of motion and neck supple. Lymphadenopathy:      Cervical: No cervical adenopathy. Skin:     General: Skin is warm and dry. Findings: No erythema or rash. Neurological:      General: No focal deficit present. Mental Status: She is alert and oriented to person, place, and time. Cranial Nerves: No cranial nerve deficit. Motor: No abnormal muscle tone. Coordination: Coordination normal.      Deep Tendon Reflexes: Reflexes are normal and symmetric. Psychiatric:         Mood and Affect: Mood normal.         Behavior: Behavior normal.         Thought Content: Thought content normal.         Judgment: Judgment normal.           No results found.     Health Maintenance     Health Maintenance   Topic Date Due   • HIV Screening  Never done   • Colorectal Cancer Screening  Never done   • Cervical Cancer Screening  09/01/2020   • COVID-19 Vaccine (3 - Pfizer series) 12/26/2021   • Breast Cancer Screening: Mammogram  11/10/2022   • Influenza Vaccine (1) 09/01/2023   • BMI: Adult  02/28/2024   • Depression Screening  09/20/2024   • Annual Physical  09/20/2024   • DTaP,Tdap,and Td Vaccines (2 - Td or Tdap) 07/15/2028 • Hepatitis C Screening  Completed   • Pneumococcal Vaccine: Pediatrics (0 to 5 Years) and At-Risk Patients (6 to 59 Years)  Aged Out   • HIB Vaccine  Aged Out   • IPV Vaccine  Aged Out   • Hepatitis A Vaccine  Aged Out   • Meningococcal ACWY Vaccine  Aged Out   • HPV Vaccine  Aged [employer] History   Administered Date(s) Administered   • COVID-19 PFIZER VACCINE 0.3 ML IM 02/13/2021, 10/31/2021   • INFLUENZA 10/15/2018, 10/15/2019   • Influenza Quadrivalent Preservative Free 3 years and older IM 11/01/2016, 10/15/2017   • Tdap 07/15/2018       Kaleb Kramer MD  [address] [place] [address]

## 2023-10-25 LAB — COLOGUARD RESULT REPORTABLE: NEGATIVE

## 2023-12-13 NOTE — TELEPHONE ENCOUNTER
CPX BW ORDERS PLACED  Chief Complaint  MRI RESULTS    Subjective          Heidi Webb presents to [facility]  History of Present Illness  Pt is here to discuss her recent MRI. She has had persistent low back pain and her GYN ordered an MRI recently. She is taking Gabapentin which is helping somewhat.       Objective   Vital Signs:   There were no vitals taken for this visit.    There is no height or weight on file to calculate BMI.    Review of Systems   Constitutional:  Negative for fatigue and fever.   Respiratory:  Negative for shortness of breath.    Cardiovascular:  Negative for chest pain, palpitations and leg swelling.   Neurological:  Negative for syncope.   Psychiatric/Behavioral:  The patient is not nervous/anxious.           Current Outpatient Medications:     albuterol sulfate HFA (Ventolin HFA) 108 (90 Base) MCG/ACT inhaler, TAKE 2 PUFFS BY MOUTH EVERY 4 HOURS AS NEEDED FOR WHEEZE, Disp: 8 g, Rfl: 2    buprenorphine-naloxone (SUBOXONE) 8-2 MG per SL tablet, , Disp: , Rfl:     FLUoxetine (PROzac) 20 MG capsule, Take 1 capsule by mouth Daily., Disp: 90 capsule, Rfl: 1    gabapentin (Neurontin) 800 MG tablet, Take 1 tablet by mouth 4 (Four) Times a Day. Cancel the 600mg prescription please and the 90 quantity, Disp: 120 tablet, Rfl: 2    hydrOXYzine (ATARAX) 25 MG tablet, Take 1 tablet by mouth 3 (Three) Times a Day As Needed for Anxiety., Disp: 30 tablet, Rfl: 2    ibuprofen (ADVIL,MOTRIN) 800 MG tablet, Take 1 tablet by mouth Every 8 (Eight) Hours As Needed for Moderate Pain., Disp: 90 tablet, Rfl: 5    linaclotide (Linzess) 145 MCG capsule capsule, TAKE 1 TABLET BY MOUTH EVERY MORNING, Disp: 30 capsule, Rfl: 0    losartan (Cozaar) 50 MG tablet, Take 1 tablet by mouth Daily., Disp: 90 tablet, Rfl: 1    naloxone (NARCAN) 4 MG/0.1ML nasal spray, AS DIRECTED FOR OVERDOSE, THEN CALL 911, Disp: , Rfl:     nicotine (Nicoderm CQ) 21 MG/24HR patch, Place 1 patch on the skin as directed by provider  Daily., Disp: 30 patch, Rfl: 1    nicotine polacrilex (Nicorette) 4 MG gum, Chew 1 each As Needed for Smoking Cessation., Disp: 100 each, Rfl: 2    Umeclidinium-Vilanterol (ANORO ELLIPTA) 62.5-25 MCG/ACT aerosol powder  inhaler, Inhale 1 puff Daily., Disp: 1 each, Rfl: 11      Allergies: Ciprofloxacin and Sulfa antibiotics    Physical Exam  Constitutional:       Appearance: Normal appearance.   Neurological:      Mental Status: She is alert.   Psychiatric:         Mood and Affect: Mood normal.         Behavior: Behavior normal.         Thought Content: Thought content normal.         Judgment: Judgment normal.          Result Review :                   Assessment and Plan    Diagnoses and all orders for this visit:    1. Lumbar pain (Primary)  Comments:  We discussed recent MRI ordered by GYN. F/U with neurosurgery. Continue Gabapentin.  Orders:  -     Ambulatory Referral to Neurosurgery  -     gabapentin (Neurontin) 800 MG tablet; Take 1 tablet by mouth 4 (Four) Times a Day. Cancel the 600mg prescription please and the 90 quantity  Dispense: 120 tablet; Refill: 2                Follow Up   Return in about 3 months (around 3/13/2024) for Recheck.  Patient was given instructions and counseling regarding her condition or for health maintenance advice. Please see specific information pulled into the AVS if appropriate.     MARIANGEL Couch

## 2024-06-11 ENCOUNTER — TELEPHONE (OUTPATIENT)
Age: 49
End: 2024-06-11

## 2024-06-11 NOTE — TELEPHONE ENCOUNTER
"  Assessment & Plan       ICD-10-CM    1. Abdominal pain, epigastric  R10.13 Comprehensive metabolic panel (BMP + Alb, Alk Phos, ALT, AST, Total. Bili, TP)     CBC with platelets and differential     Amylase     Lipase     omeprazole (PRILOSEC) 20 MG DR capsule     Lipase     Amylase     CBC with platelets and differential     Comprehensive metabolic panel (BMP + Alb, Alk Phos, ALT, AST, Total. Bili, TP)     Helicobacter pylori Antigen Stool     Helicobacter pylori Antigen Stool     CANCELED: H PYLORI, STOOL, EIA   2. Hypothyroidism, unspecified type  E03.9 TSH with free T4 reflex     Talk to patient about her concerns.  I believe she has GERD.  We will treat with omeprazole.  Labs are pending.  Follow-up depend on lab results.  We talked about dietary changes.  She will follow-up with her primary provider in 4 weeks.  No follow-ups on file.    MARCY Vicente [facility] [address] DONTRELL Urias is a 48 year old who presents for the following health issues     History of Present Illness       Reason for visit:  Sore stomach  Symptom onset:  More than a month  Symptoms include:  Sore  Symptom intensity:  Mild  Symptom progression:  Staying the same  Had these symptoms before:  No  What makes it worse:  Take pain medicine  What makes it better:  None    She eats 2-3 servings of fruits and vegetables daily.She consumes 0 sweetened beverage(s) daily.She exercises with enough effort to increase her heart rate 20 to 29 minutes per day.  She exercises with enough effort to increase her heart rate 3 or less days per week.   She is taking medications regularly.   \"hard burn\"   Increase with spicy foods.   Worse in a.m.  Nausea.  Radiates to upper back.     Review of Systems   Constitutional, HEENT, cardiovascular, pulmonary, gi and gu systems are negative, except as otherwise noted.      Objective    /87   Pulse 72   Temp 97.6  F (36.4  C) (Tympanic)   Resp 16   Wt 68 kg (150 lb)   " Patient requesting for an appointment due to cough, chills, body ache for the last 4 days. Confirmed with clerical in-office staff that there is no availability today and advise to call back tomorrow for same days or go to nearest urgent care. I advised patient of the information.    SpO2 97%   BMI 30.30 kg/m    Body mass index is 30.3 kg/m .  Physical Exam   GENERAL: healthy, alert and no distress  EYES: Eyes grossly normal to inspection, PERRL and conjunctivae and sclerae normal  HENT: ear canals and TM's normal, nose and mouth without ulcers or lesions  NECK: no adenopathy, no asymmetry, masses, or scars and thyroid normal to palpation  RESP: lungs clear to auscultation - no rales, rhonchi or wheezes  CV: regular rate and rhythm, normal S1 S2, no S3 or S4, no murmur, click or rub, no peripheral edema and peripheral pulses strong  ABDOMEN: soft, mild epigastric tenderness, no hepatosplenomegaly, no masses and bowel sounds normal  MS: no gross musculoskeletal defects noted, no edema  SKIN: no suspicious lesions or rashes  NEURO: Normal strength and tone, mentation intact and speech normal  PSYCH: mentation appears normal, affect normal/bright    Results for orders placed or performed in visit on 03/18/22   CBC with platelets and differential     Status: None   Result Value Ref Range    WBC Count 8.5 4.0 - 11.0 10e3/uL    RBC Count 4.75 3.80 - 5.20 10e6/uL    Hemoglobin 13.8 11.7 - 15.7 g/dL    Hematocrit 42.4 35.0 - 47.0 %    MCV 89 78 - 100 fL    MCH 29.1 26.5 - 33.0 pg    MCHC 32.5 31.5 - 36.5 g/dL    RDW 13.0 10.0 - 15.0 %    Platelet Count 230 150 - 450 10e3/uL    % Neutrophils 74 %    % Lymphocytes 19 %    % Monocytes 6 %    % Eosinophils 2 %    % Basophils 0 %    Absolute Neutrophils 6.3 1.6 - 8.3 10e3/uL    Absolute Lymphocytes 1.6 0.8 - 5.3 10e3/uL    Absolute Monocytes 0.5 0.0 - 1.3 10e3/uL    Absolute Eosinophils 0.1 0.0 - 0.7 10e3/uL    Absolute Basophils 0.0 0.0 - 0.2 10e3/uL   CBC with platelets and differential     Status: None    Narrative    The following orders were created for panel order CBC with platelets and differential.  Procedure                               Abnormality         Status                     ---------                               -----------          ------                     CBC with platelets and d...[[id number]]                      Final result                 Please view results for these tests on the individual orders.     Unresulted Labs Ordered in the Past 30 Days of this Admission     Date and Time Order Name Status Description    3/18/2022  8:22 AM TSH WITH FREE T4 REFLEX In process     3/18/2022  8:22 AM COMPREHENSIVE METABOLIC PANEL In process     3/18/2022  8:22 AM AMYLASE In process     3/18/2022  8:22 AM LIPASE In process

## 2024-06-17 ENCOUNTER — TELEPHONE (OUTPATIENT)
Dept: FAMILY MEDICINE CLINIC | Facility: CLINIC | Age: 49
End: 2024-06-17

## 2024-06-17 ENCOUNTER — OFFICE VISIT (OUTPATIENT)
Dept: FAMILY MEDICINE CLINIC | Facility: CLINIC | Age: 49
End: 2024-06-17
Payer: COMMERCIAL

## 2024-06-17 VITALS
TEMPERATURE: 97.8 F | BODY MASS INDEX: 21.22 KG/M2 | HEIGHT: 68 IN | SYSTOLIC BLOOD PRESSURE: 118 MMHG | RESPIRATION RATE: 16 BRPM | WEIGHT: 140 LBS | HEART RATE: 67 BPM | OXYGEN SATURATION: 98 % | DIASTOLIC BLOOD PRESSURE: 72 MMHG

## 2024-06-17 DIAGNOSIS — L23.7 POISON IVY: Primary | ICD-10-CM

## 2024-06-17 PROCEDURE — 99213 OFFICE O/P EST LOW 20 MIN: CPT | Performed by: NURSE PRACTITIONER

## 2024-06-17 PROCEDURE — 96372 THER/PROPH/DIAG INJ SC/IM: CPT | Performed by: NURSE PRACTITIONER

## 2024-06-17 RX ORDER — DOXYCYCLINE HYCLATE 100 MG/1
100 CAPSULE ORAL EVERY 12 HOURS SCHEDULED
COMMUNITY
Start: 2024-06-11

## 2024-06-17 RX ORDER — DEXAMETHASONE SODIUM PHOSPHATE 4 MG/ML
4 INJECTION, SOLUTION INTRA-ARTICULAR; INTRALESIONAL; INTRAMUSCULAR; INTRAVENOUS; SOFT TISSUE ONCE
Status: COMPLETED | OUTPATIENT
Start: 2024-06-17 | End: 2024-06-17

## 2024-06-17 RX ORDER — BENZONATATE 100 MG/1
100 CAPSULE ORAL 2 TIMES DAILY PRN
COMMUNITY
Start: 2024-06-11

## 2024-06-17 RX ORDER — CLOBETASOL PROPIONATE 0.5 MG/G
CREAM TOPICAL 2 TIMES DAILY
Qty: 30 G | Refills: 0 | Status: SHIPPED | OUTPATIENT
Start: 2024-06-17

## 2024-06-17 RX ORDER — PREDNISONE 20 MG/1
TABLET ORAL
Qty: 11 TABLET | Refills: 0 | Status: SHIPPED | OUTPATIENT
Start: 2024-06-17

## 2024-06-17 RX ADMIN — DEXAMETHASONE SODIUM PHOSPHATE 4 MG: 4 INJECTION, SOLUTION INTRA-ARTICULAR; INTRALESIONAL; INTRAMUSCULAR; INTRAVENOUS; SOFT TISSUE at 14:43

## 2024-06-17 NOTE — TELEPHONE ENCOUNTER
Spoke to Dena and scheduled her for 2:05 with AG.  Patient understand there may be a wait and is very appreciative. No further action required

## 2024-06-17 NOTE — PROGRESS NOTES
Assessment/Plan:    1. Poison ivy  -     predniSONE 20 mg tablet; Take 2 tablets PO daily x's 3 days, then take 1 tablet daily x's 3 days, then take 1/2 tablet daily x's 3 days  -     dexamethasone (DECADRON) injection 4 mg  -     clobetasol (TEMOVATE) 0.05 % cream; Apply topically 2 (two) times a day            Return if symptoms worsen or fail to improve.    Subjective:      Patient ID: Dena Glass is a 48 y.o. female.    Chief Complaint   Patient presents with    Poison Bailee       Rash  This is a new problem. The current episode started in the past 7 days. The problem is unchanged. The affected locations include the left upper leg and right upper leg. The rash is characterized by redness and itchiness. She was exposed to a new medication (doxycycline; exposure to river). Pertinent negatives include no cough, fatigue, fever or shortness of breath. Past treatments include topical steroids. The treatment provided no relief.       The following portions of the patient's history were reviewed and updated as appropriate: allergies, current medications, past family history, past medical history, past social history, past surgical history and problem list.    Review of Systems   Constitutional:  Negative for chills, fatigue and fever.   Respiratory:  Negative for cough, chest tightness and shortness of breath.    Cardiovascular:  Negative for chest pain.   Skin:  Positive for rash.         Current Outpatient Medications   Medication Sig Dispense Refill    clobetasol (TEMOVATE) 0.05 % cream Apply topically 2 (two) times a day 30 g 0    doxycycline hyclate (VIBRAMYCIN) 100 mg capsule Take 100 mg by mouth every 12 (twelve) hours      KYLEENA 19.5 MG intrauterine device       Multiple Vitamins-Minerals (multivitamin with minerals) tablet Take 1 tablet by mouth daily      Multiple Vitamins-Minerals (ZINC PO) Take by mouth in the morning      predniSONE 20 mg tablet Take 2 tablets PO daily x's 3 days, then take 1 tablet  "daily x's 3 days, then take 1/2 tablet daily x's 3 days 11 tablet 0    benzonatate (TESSALON PERLES) 100 mg capsule Take 100 mg by mouth 2 (two) times a day as needed (Patient not taking: Reported on 6/17/2024)       No current facility-administered medications for this visit.       Objective:    /72 (BP Location: Left arm, Patient Position: Sitting, Cuff Size: Standard)   Pulse 67   Temp 97.8 °F (36.6 °C) (Temporal)   Resp 16   Ht 5' 8\" (1.727 m)   Wt 63.5 kg (140 lb)   LMP 05/15/2024 (Approximate)   SpO2 98%   BMI 21.29 kg/m²        Physical Exam  Vitals reviewed.   Constitutional:       Appearance: Normal appearance.   HENT:      Head: Normocephalic and atraumatic.   Cardiovascular:      Rate and Rhythm: Normal rate and regular rhythm.      Heart sounds: Normal heart sounds.   Pulmonary:      Breath sounds: Normal breath sounds.   Skin:     Findings: Erythema and rash present. Rash is vesicular.      Comments: Multiple erythematous maculopapular rash noted on BL upper thighs and gluteal area, dry and intact   Neurological:      Mental Status: She is alert and oriented to person, place, and time.   Psychiatric:         Mood and Affect: Mood normal.                JOSE Garcia  "

## 2024-06-17 NOTE — TELEPHONE ENCOUNTER
Access center called and they had Dena on the phone.    States she has poison ivy on side and it is spreading.  She has been using over the counter cream    States went to Urgent Care last week and they put her on doxycycline.  She doesn't think it is from that.     Both of you are booked.  She was hoping to be seen today.    Please advise

## 2024-08-08 DIAGNOSIS — Z13.220 SCREENING FOR HYPERLIPIDEMIA: ICD-10-CM

## 2024-08-08 DIAGNOSIS — Z13.220 SCREENING FOR LIPID DISORDERS: ICD-10-CM

## 2024-08-08 DIAGNOSIS — K21.9 GERD WITHOUT ESOPHAGITIS: ICD-10-CM

## 2024-08-08 DIAGNOSIS — Z13.29 SCREENING FOR HYPOTHYROIDISM: ICD-10-CM

## 2024-08-08 DIAGNOSIS — Z00.00 ROUTINE GENERAL MEDICAL EXAMINATION AT A HEALTH CARE FACILITY: Primary | ICD-10-CM

## 2024-08-08 DIAGNOSIS — Z13.29 SCREENING FOR THYROID DISORDER: ICD-10-CM

## 2024-09-03 ENCOUNTER — TELEPHONE (OUTPATIENT)
Age: 49
End: 2024-09-03

## 2024-09-05 NOTE — TELEPHONE ENCOUNTER
Patient calling stating she had called on 9/3 to get another insurance referral for her appt that is today 9/5 at 11:45 am with dermatology Dr. Anaya.  Their office said they have not received anything.  Please place new amb referral and submit for insurance ref  Jennifer Anaya MD  Phone: [phone]  Fax: [phone]   [facility] [address]    Dx [phone]

## 2024-09-05 NOTE — TELEPHONE ENCOUNTER
Completed referral through [employer] and faxed to Dr Anaya at fax# [phone].    Informed Dena referral was completed and faxed.  No further action required

## 2024-09-23 ENCOUNTER — TELEPHONE (OUTPATIENT)
Dept: FAMILY MEDICINE CLINIC | Facility: CLINIC | Age: 49
End: 2024-09-23

## 2024-09-23 ENCOUNTER — OFFICE VISIT (OUTPATIENT)
Dept: FAMILY MEDICINE CLINIC | Facility: CLINIC | Age: 49
End: 2024-09-23
Payer: COMMERCIAL

## 2024-09-23 VITALS
HEART RATE: 76 BPM | HEIGHT: 68 IN | BODY MASS INDEX: 21.67 KG/M2 | DIASTOLIC BLOOD PRESSURE: 80 MMHG | WEIGHT: 143 LBS | OXYGEN SATURATION: 98 % | RESPIRATION RATE: 18 BRPM | TEMPERATURE: 97.3 F | SYSTOLIC BLOOD PRESSURE: 118 MMHG

## 2024-09-23 DIAGNOSIS — K21.9 GERD WITHOUT ESOPHAGITIS: ICD-10-CM

## 2024-09-23 DIAGNOSIS — Z13.220 SCREENING FOR HYPERLIPIDEMIA: ICD-10-CM

## 2024-09-23 DIAGNOSIS — Z13.6 SCREENING FOR HYPERTENSION: ICD-10-CM

## 2024-09-23 DIAGNOSIS — K22.2 ESOPHAGEAL STRICTURE: ICD-10-CM

## 2024-09-23 DIAGNOSIS — Z00.00 ROUTINE GENERAL MEDICAL EXAMINATION AT A HEALTH CARE FACILITY: Primary | ICD-10-CM

## 2024-09-23 DIAGNOSIS — Z12.31 ENCOUNTER FOR SCREENING MAMMOGRAM FOR BREAST CANCER: ICD-10-CM

## 2024-09-23 DIAGNOSIS — F41.9 ANXIETY: ICD-10-CM

## 2024-09-23 DIAGNOSIS — I34.0 NONRHEUMATIC MITRAL VALVE REGURGITATION: ICD-10-CM

## 2024-09-23 DIAGNOSIS — Z13.29 SCREENING FOR HYPOTHYROIDISM: ICD-10-CM

## 2024-09-23 PROCEDURE — 93000 ELECTROCARDIOGRAM COMPLETE: CPT | Performed by: FAMILY MEDICINE

## 2024-09-23 PROCEDURE — 99396 PREV VISIT EST AGE 40-64: CPT | Performed by: FAMILY MEDICINE

## 2024-09-23 RX ORDER — ALPRAZOLAM 0.25 MG
0.25 TABLET ORAL 2 TIMES DAILY PRN
Qty: 30 TABLET | Refills: 0 | Status: SHIPPED | OUTPATIENT
Start: 2024-09-23

## 2024-09-23 RX ORDER — VITAMIN B COMPLEX
1 CAPSULE ORAL DAILY
COMMUNITY

## 2024-09-23 NOTE — PROGRESS NOTES
Depression Screening and Follow-up Plan: Patient was screened for depression during today's encounter. They screened negative with a PHQ-2 score of 2.    FAMILY PRACTICE HEALTH MAINTENANCE OFFICE VISIT  [facility]    NAME: Dena Glass  AGE: 49 y.o. SEX: female  : 1975     DATE: 2024    Assessment and Plan     Problem List Items Addressed This Visit          Cardiovascular and Mediastinum    Nonrheumatic mitral valve regurgitation       Digestive    Esophageal stricture    GERD without esophagitis     STABLE  DENIES ANY CP, INDIGESTION, OR COUGH  NOTES NO MELENA OR HEMATOCHEZIA  NO HEMATEMESIS  COMPLIANT WITH MEDICATION  NO CONCERNS    - CONTINUE CURRENT TREATMENT PLAN  - MEDICATION AS PRESCRIBED  - AVOID CAFFEINE, ALCOHOL OR SMOKING            Other Visit Diagnoses       Routine general medical examination at a health care facility    -  Primary    Screening for hypertension        Relevant Orders    POCT ECG    Screening for hyperlipidemia        Screening for hypothyroidism        Encounter for screening mammogram for breast cancer        Relevant Orders    Mammo screening bilateral w 3d and cad                 Return in about 1 year (around 2025) for Annual physical.        Chief Complaint     Chief Complaint   Patient presents with    Annual Exam       History of Present Illness     DISCUSSED HEALTH ISSUES  REVIEWED MEDICAL RECORD  NO CONCERNS AT THIS TIME          Well Adult Physical   Patient here for a comprehensive physical exam.      Diet and Physical Activity  Diet: well balanced diet  Weight concerns: None, patient's BMI is between 18.5-24.9  Exercise: intermittently      Depression Screen  PHQ-2/9 Depression Screening    Little interest or pleasure in doing things: 1 - several days  Feeling down, depressed, or hopeless: 1 - several days          General Health  Hearing: Normal:  bilateral  Vision: no vision problems  Dental:  regular dental visits    Reproductive Health          The following portions of the patient's history were reviewed and updated as appropriate: allergies, current medications, past family history, past medical history, past social history, past surgical history and problem list.    Review of Systems     Review of Systems   Constitutional:  Negative for chills, fatigue and fever.   HENT:  Negative for congestion, ear discharge, ear pain, mouth sores, postnasal drip, sore throat and trouble swallowing.    Eyes:  Negative for pain, discharge and visual disturbance.   Respiratory:  Negative for cough, shortness of breath and wheezing.    Cardiovascular:  Negative for chest pain, palpitations and leg swelling.   Gastrointestinal:  Negative for abdominal distention, abdominal pain, blood in stool, diarrhea and nausea.   Endocrine: Negative for polydipsia, polyphagia and polyuria.   Genitourinary:  Negative for dysuria, frequency, hematuria and urgency.   Musculoskeletal:  Negative for arthralgias, gait problem and joint swelling.   Skin:  Negative for pallor and rash.   Neurological:  Negative for dizziness, syncope, speech difficulty, weakness, light-headedness, numbness and headaches.   Hematological:  Negative for adenopathy.   Psychiatric/Behavioral:  Negative for behavioral problems, confusion and sleep disturbance. The patient is not nervous/anxious.        Past Medical History     Past Medical History:   Diagnosis Date    Anal fissure     GERD (gastroesophageal reflux disease)        Past Surgical History     History reviewed. No pertinent surgical history.    Social History     Social History     Socioeconomic History    Marital status: /Civil Union     Spouse name: None    Number of children: None    Years of education: None    Highest education level: None   Occupational History    None   Tobacco Use    Smoking status: Former    Smokeless tobacco: Never    Tobacco comments:     never a smoker as per  [employer]   Vaping Use    Vaping status: Never Used   Substance and Sexual Activity    Alcohol use: Yes     Comment: socially; occasionally    Drug use: No    Sexual activity: None   Other Topics Concern    None   Social History Narrative    Dental care, regularly    Drinks coffee    Occasional caffeine consumption     Social Determinants of Health     Financial Resource Strain: Not on file   Food Insecurity: Not on file   Transportation Needs: Not on file   Physical Activity: Not on file   Stress: Not on file   Social Connections: Not on file   Intimate Partner Violence: Not on file   Housing Stability: Not on file       Family History     Family History   Problem Relation Age of Onset    Hypertension Mother     Stroke Mother     Thyroid cancer Mother     Testicular cancer Father     Mental illness Maternal Aunt     Alzheimer's disease Paternal Grandfather     Schizophrenia Maternal Aunt     Schizophrenia Cousin     Breast cancer Family     Substance Abuse Neg Hx        Current Medications       Current Outpatient Medications:     KYLEENA 19.5 MG intrauterine device, , Disp: , Rfl:     Multiple Vitamins-Minerals (multivitamin with minerals) tablet, Take 1 tablet by mouth daily, Disp: , Rfl:     Multiple Vitamins-Minerals (ZINC PO), Take by mouth in the morning, Disp: , Rfl:      Allergies     Allergies   Allergen Reactions    Bactrim [Sulfamethoxazole-Trimethoprim] Hives    Sulfa Antibiotics Hives       Objective     There were no vitals taken for this visit.     Physical Exam  Vitals reviewed.   Constitutional:       General: She is not in acute distress.     Appearance: Normal appearance. She is well-developed and normal weight. She is not ill-appearing.   HENT:      Head: Normocephalic and atraumatic.      Right Ear: Tympanic membrane and external ear normal.      Left Ear: Tympanic membrane and external ear normal.      Nose: Nose normal.      Mouth/Throat:      Mouth: Mucous membranes are moist.   Eyes:       General:         Right eye: No discharge.         Left eye: No discharge.      Conjunctiva/sclera: Conjunctivae normal.      Pupils: Pupils are equal, round, and reactive to light.   Neck:      Thyroid: No thyromegaly.   Cardiovascular:      Rate and Rhythm: Normal rate and regular rhythm.      Heart sounds: Normal heart sounds. No murmur heard.  Pulmonary:      Effort: Pulmonary effort is normal.      Breath sounds: Normal breath sounds. No wheezing or rales.   Abdominal:      General: Bowel sounds are normal. There is no distension.      Palpations: Abdomen is soft. There is no mass.      Tenderness: There is no abdominal tenderness. There is no guarding or rebound.   Musculoskeletal:         General: No tenderness or deformity. Normal range of motion.      Cervical back: Normal range of motion and neck supple.   Lymphadenopathy:      Cervical: No cervical adenopathy.   Skin:     General: Skin is warm and dry.      Findings: No erythema or rash.   Neurological:      General: No focal deficit present.      Mental Status: She is alert and oriented to person, place, and time.      Cranial Nerves: No cranial nerve deficit.      Sensory: No sensory deficit.      Motor: No weakness or abnormal muscle tone.      Coordination: Coordination normal.      Gait: Gait normal.      Deep Tendon Reflexes: Reflexes are normal and symmetric. Reflexes normal.   Psychiatric:         Mood and Affect: Mood normal.         Behavior: Behavior normal.         Thought Content: Thought content normal.         Judgment: Judgment normal.         No results found.    Health Maintenance     Health Maintenance   Topic Date Due    HIV Screening  Never done    Cervical Cancer Screening  09/01/2022    Breast Cancer Screening: Mammogram  11/10/2022    COVID-19 Vaccine (3 - 2023-24 season) 09/01/2024    Depression Screening  09/20/2024    Annual Physical  09/20/2024    Influenza Vaccine (1) 09/01/2024    Zoster Vaccine (1 of 2) 08/31/2025     Colorectal Cancer Screening  10/19/2026    DTaP,Tdap,and Td Vaccines (2 - Td or Tdap) 07/15/2028    RSV Vaccine Age 60+ Years (1 - 1-dose 60+ series) 08/31/2035    Hepatitis C Screening  Completed    RSV Vaccine age 0-20 Months  Aged Out    Pneumococcal Vaccine: Pediatrics (0 to 5 Years) and At-Risk Patients (6 to 64 Years)  Aged Out    HIB Vaccine  Aged Out    IPV Vaccine  Aged Out    Hepatitis A Vaccine  Aged Out    Meningococcal ACWY Vaccine  Aged Out    HPV Vaccine  Aged Out     Immunization History   Administered Date(s) Administered    COVID-19 PFIZER VACCINE 0.3 ML IM 02/13/2021, 10/31/2021    INFLUENZA 10/15/2018, 10/15/2019    Influenza Quadrivalent Preservative Free 3 years and older IM 11/01/2016, 10/15/2017    Tdap 07/15/2018       Burton Hammonds MD  [facility]

## 2024-09-23 NOTE — PATIENT INSTRUCTIONS
DISCUSSED HEALTH ISSUES  HEALTHY DIET AND EXERCISE  BW WILL BE REVIEWED  MAMMOGRAPHY   RECOMMEND CALCIUM 4191-3084 MG DAILY  VITAMIN D3  1000 IU DAILY  RV IN 1 YEAR FOR ANNUAL EXAM, SOONER IF NEEDED

## 2024-09-23 NOTE — TELEPHONE ENCOUNTER
Echo complete w/ contrast if indicated [id number]    Will have done at an outside facility    [facility]  [address] [place]  [address]    NPI [id number]    Not scheduled

## 2024-09-24 ENCOUNTER — TELEPHONE (OUTPATIENT)
Dept: ADMINISTRATIVE | Facility: OTHER | Age: 49
End: 2024-09-24

## 2024-09-24 NOTE — LETTER
Procedure Request Form: Cervical Cancer Screening      Date Requested: 10/23/24  Patient: Dena Glass  Patient : 1975   Referring Provider: JOSE Garcia        Date of Procedure ______________________________       The above patient has informed us that they have completed their   most recent Cervical Cancer Screening at your facility. Please complete   this form and attach all corresponding procedure reports/results.    Comments __________________________________________________________  ____________________________________________________________________  ____________________________________________________________________  ____________________________________________________________________    Facility Completing Procedure _________________________________________    Form Completed By (print name) _______________________________________      Signature __________________________________________________________      These reports are needed for  compliance.    Please fax this completed form and a copy of the procedure report to our office located at [address] [place], [place] [id number] as soon as possible to Fax [phone] allyssa Feliz: Phone [phone]    We thank you for your assistance in treating our mutual patient.

## 2024-09-24 NOTE — LETTER
Procedure Request Form: Cervical Cancer Screening      Date Requested: 24  Patient: Dena Glass  Patient : 1975   Referring Provider: JOSE Garcia        Date of Procedure ______________________________       The above patient has informed us that they have completed their   most recent Cervical Cancer Screening at your facility. Please complete   this form and attach all corresponding procedure reports/results.    Comments __________________________________________________________  ____________________________________________________________________  ____________________________________________________________________  ____________________________________________________________________    Facility Completing Procedure _________________________________________    Form Completed By (print name) _______________________________________      Signature __________________________________________________________      These reports are needed for  compliance.    Please fax this completed form and a copy of the procedure report to our office located at [address] [place], [place] [address] as soon as possible to Fax [phone] allyssa Feliz: Phone [phone]    We thank you for your assistance in treating our mutual patient.

## 2024-09-24 NOTE — LETTER
Procedure Request Form: Cervical Cancer Screening      Date Requested: 10/09/24  Patient: Dena Glass  Patient : 1975   Referring Provider: JOSE Garcia        Date of Procedure ______________________________       The above patient has informed us that they have completed their   most recent Cervical Cancer Screening at your facility. Please complete   this form and attach all corresponding procedure reports/results.    Comments __________________________________________________________  ____________________________________________________________________  ____________________________________________________________________  ____________________________________________________________________    Facility Completing Procedure _________________________________________    Form Completed By (print name) _______________________________________      Signature __________________________________________________________      These reports are needed for  compliance.    Please fax this completed form and a copy of the procedure report to our office located at [address] [place], [place] [id number] as soon as possible to Fax [phone] allyssa Feliz: Phone [phone]    We thank you for your assistance in treating our mutual patient.

## 2024-09-24 NOTE — TELEPHONE ENCOUNTER
Upon review of the In Basket request and the patient's chart, initial outreach has been made via fax to facility. Please see Contacts section for details.     Thank you  Tray Chua MA

## 2024-09-24 NOTE — TELEPHONE ENCOUNTER
----- Message from Behzad MOREAU sent at 9/23/2024 12:28 PM EDT -----  Regarding: CARE GAP REQUST - PAP  09/23/24 12:28 PM    Hello, our patient attached above has had Pap Smear (HPV) aka Cervical Cancer Screening completed/performed. Please assist in updating the patient chart by making an External outreach to [facility] located in [place], [place]. The date of service is Last Available.    Thank you,  Behzad Preston, [facility]  [facility]

## 2024-09-25 NOTE — TELEPHONE ENCOUNTER
Left a message that Dena's echo was approved.    I faxed her order to [facility]  [phone]    I will also send a my chart message.    No further action required    Demetra Carpenter  /[place]

## 2024-10-09 NOTE — TELEPHONE ENCOUNTER
As a follow-up, a second attempt has been made for outreach via telephone call to facility. Please see Contacts section for details.    Thank you  Tray Chua MA

## 2024-10-23 NOTE — TELEPHONE ENCOUNTER
As a final attempt, a third outreach has been made via fax to facility. Please see Contacts section for details. This encounter will be closed and completed by end of day. Should we receive the requested information because of previous outreach attempts, the requested patient's chart will be updated appropriately.     Thank you  Tray Chua MA

## 2024-10-25 NOTE — TELEPHONE ENCOUNTER
Upon review of the In Basket request we were able to locate, review, and update the patient chart as requested for Pap Smear (HPV) aka Cervical Cancer Screening.    Any additional questions or concerns should be emailed to the Practice Liaisons via the appropriate education email address, please do not reply via In Basket.    Thank you  Tray Chua MA   [facility]

## 2024-12-13 ENCOUNTER — TELEPHONE (OUTPATIENT)
Age: 49
End: 2024-12-13

## 2024-12-13 DIAGNOSIS — Z23 NEED FOR IMMUNIZATION AGAINST TYPHOID: Primary | ICD-10-CM

## 2024-12-13 NOTE — TELEPHONE ENCOUNTER
Angelina    Please see the message below.  Is this a pill or a vaccine?  Do we have to send an order for this?    Demetra

## 2024-12-13 NOTE — TELEPHONE ENCOUNTER
Traveling to [place] on Jan 18 for 1 week . Would like a Typhoid vaccine Can office order this for patient  Please advise

## 2024-12-18 NOTE — TELEPHONE ENCOUNTER
Left a message for Dena to return my call.  Informed her that we do not have that vaccine in our office    Asked    1)  Is she looking for a pill or vaccine?  2)  Either way, we would need to know what pharmacy?  3)  She can check the CDC website to see what facilities will do vacciones

## 2024-12-20 NOTE — TELEPHONE ENCOUNTER
Pt called back to confirm that she would like the pill form to be sent to her pharmacy Thank you   [facility] - [address] [address]  Phone: [phone]  Fax: [phone]

## 2024-12-20 NOTE — TELEPHONE ENCOUNTER
Please inform Dena, she should take 1wk prior to exposure; give 1h before meal w/ cold or lukewarm drink

## 2025-01-06 ENCOUNTER — TELEPHONE (OUTPATIENT)
Age: 50
End: 2025-01-06

## 2025-01-06 NOTE — TELEPHONE ENCOUNTER
Dena calling to see if she had a Hep A vaccine.  She is traveling and needs to know if she needs this vaccine.  Please call patient back.  Thank you